# Patient Record
Sex: FEMALE | Race: WHITE | Employment: OTHER | URBAN - METROPOLITAN AREA
[De-identification: names, ages, dates, MRNs, and addresses within clinical notes are randomized per-mention and may not be internally consistent; named-entity substitution may affect disease eponyms.]

---

## 2018-01-01 ENCOUNTER — HOSPITAL ENCOUNTER (OUTPATIENT)
Dept: WOUND CARE | Age: 83
Discharge: HOME | End: 2018-05-10
Payer: MEDICARE

## 2018-01-01 ENCOUNTER — APPOINTMENT (OUTPATIENT)
Dept: GENERAL RADIOLOGY | Age: 83
DRG: 189 | End: 2018-01-01
Attending: PHYSICIAN ASSISTANT
Payer: MEDICARE

## 2018-01-01 ENCOUNTER — APPOINTMENT (OUTPATIENT)
Dept: GENERAL RADIOLOGY | Age: 83
DRG: 189 | End: 2018-01-01
Attending: NURSE PRACTITIONER
Payer: MEDICARE

## 2018-01-01 ENCOUNTER — APPOINTMENT (OUTPATIENT)
Dept: GENERAL RADIOLOGY | Age: 83
DRG: 189 | End: 2018-01-01
Attending: INTERNAL MEDICINE
Payer: MEDICARE

## 2018-01-01 ENCOUNTER — HOSPITAL ENCOUNTER (INPATIENT)
Age: 83
LOS: 3 days | DRG: 189 | End: 2018-06-08
Attending: EMERGENCY MEDICINE | Admitting: FAMILY MEDICINE
Payer: MEDICARE

## 2018-01-01 ENCOUNTER — HOSPITAL ENCOUNTER (OUTPATIENT)
Dept: GENERAL RADIOLOGY | Age: 83
Discharge: HOME | End: 2018-05-10
Attending: PAIN MEDICINE
Payer: MEDICARE

## 2018-01-01 ENCOUNTER — HOSPITAL ENCOUNTER (OUTPATIENT)
Dept: BONE DENSITY | Age: 83
Discharge: HOME | End: 2018-05-10
Attending: PAIN MEDICINE
Payer: MEDICARE

## 2018-01-01 ENCOUNTER — APPOINTMENT (OUTPATIENT)
Dept: GENERAL RADIOLOGY | Age: 83
DRG: 189 | End: 2018-01-01
Attending: EMERGENCY MEDICINE
Payer: MEDICARE

## 2018-01-01 VITALS
DIASTOLIC BLOOD PRESSURE: 74 MMHG | TEMPERATURE: 97.7 F | RESPIRATION RATE: 22 BRPM | SYSTOLIC BLOOD PRESSURE: 129 MMHG | HEART RATE: 96 BPM

## 2018-01-01 VITALS
HEART RATE: 101 BPM | OXYGEN SATURATION: 60 % | WEIGHT: 100.97 LBS | TEMPERATURE: 98 F | DIASTOLIC BLOOD PRESSURE: 58 MMHG | HEIGHT: 55 IN | BODY MASS INDEX: 23.37 KG/M2 | RESPIRATION RATE: 32 BRPM | SYSTOLIC BLOOD PRESSURE: 103 MMHG

## 2018-01-01 DIAGNOSIS — R06.02 SHORTNESS OF BREATH: ICD-10-CM

## 2018-01-01 DIAGNOSIS — R53.81 PHYSICAL DEBILITY: ICD-10-CM

## 2018-01-01 DIAGNOSIS — M81.0 SENILE OSTEOPOROSIS: ICD-10-CM

## 2018-01-01 DIAGNOSIS — R63.30 FEEDING DIFFICULTIES: ICD-10-CM

## 2018-01-01 DIAGNOSIS — A41.9 SEPSIS, DUE TO UNSPECIFIED ORGANISM: Primary | ICD-10-CM

## 2018-01-01 DIAGNOSIS — M19.90 OSTEOARTHRITIS: ICD-10-CM

## 2018-01-01 DIAGNOSIS — Z51.5 DYING CARE: ICD-10-CM

## 2018-01-01 LAB
ALBUMIN SERPL-MCNC: 3.7 G/DL (ref 3.5–5)
ALBUMIN/GLOB SERPL: 0.8 {RATIO} (ref 1.1–2.2)
ALP SERPL-CCNC: 87 U/L (ref 45–117)
ALT SERPL-CCNC: 24 U/L (ref 12–78)
ANION GAP SERPL CALC-SCNC: 6 MMOL/L (ref 5–15)
ANION GAP SERPL CALC-SCNC: 6 MMOL/L (ref 5–15)
ANION GAP SERPL CALC-SCNC: 8 MMOL/L (ref 5–15)
APPEARANCE UR: CLEAR
ARTERIAL PATENCY WRIST A: ABNORMAL
ARTERIAL PATENCY WRIST A: ABNORMAL
ARTERIAL PATENCY WRIST A: YES
ARTERIAL PATENCY WRIST A: YES
AST SERPL-CCNC: 21 U/L (ref 15–37)
ATRIAL RATE: 114 BPM
BACTERIA SPEC CULT: ABNORMAL
BACTERIA URNS QL MICRO: NEGATIVE /HPF
BASE EXCESS BLD CALC-SCNC: 17 MMOL/L
BASE EXCESS BLDV CALC-SCNC: 25 MMOL/L
BASOPHILS # BLD: 0 K/UL (ref 0–0.1)
BASOPHILS # BLD: 0 K/UL (ref 0–0.1)
BASOPHILS NFR BLD: 0 % (ref 0–1)
BASOPHILS NFR BLD: 0 % (ref 0–1)
BDY SITE: ABNORMAL
BILIRUB SERPL-MCNC: 0.5 MG/DL (ref 0.2–1)
BILIRUB UR QL CFM: NEGATIVE
BNP SERPL-MCNC: 5419 PG/ML (ref 0–450)
BUN SERPL-MCNC: 15 MG/DL (ref 6–20)
BUN SERPL-MCNC: 20 MG/DL (ref 6–20)
BUN SERPL-MCNC: 20 MG/DL (ref 6–20)
BUN/CREAT SERPL: 27 (ref 12–20)
BUN/CREAT SERPL: 28 (ref 12–20)
BUN/CREAT SERPL: 35 (ref 12–20)
CALCIUM SERPL-MCNC: 9 MG/DL (ref 8.5–10.1)
CALCIUM SERPL-MCNC: 9.1 MG/DL (ref 8.5–10.1)
CALCIUM SERPL-MCNC: 9.6 MG/DL (ref 8.5–10.1)
CALCULATED P AXIS, ECG09: 62 DEGREES
CALCULATED R AXIS, ECG10: 14 DEGREES
CALCULATED T AXIS, ECG11: 64 DEGREES
CHLORIDE SERPL-SCNC: 106 MMOL/L (ref 97–108)
CHLORIDE SERPL-SCNC: 92 MMOL/L (ref 97–108)
CHLORIDE SERPL-SCNC: 98 MMOL/L (ref 97–108)
CO2 SERPL-SCNC: 35 MMOL/L (ref 21–32)
CO2 SERPL-SCNC: 37 MMOL/L (ref 21–32)
CO2 SERPL-SCNC: 40 MMOL/L (ref 21–32)
COLOR UR: ABNORMAL
CREAT SERPL-MCNC: 0.54 MG/DL (ref 0.55–1.02)
CREAT SERPL-MCNC: 0.57 MG/DL (ref 0.55–1.02)
CREAT SERPL-MCNC: 0.75 MG/DL (ref 0.55–1.02)
DIAGNOSIS, 93000: NORMAL
DIFFERENTIAL METHOD BLD: ABNORMAL
DIFFERENTIAL METHOD BLD: ABNORMAL
EOSINOPHIL # BLD: 0 K/UL (ref 0–0.4)
EOSINOPHIL # BLD: 0 K/UL (ref 0–0.4)
EOSINOPHIL NFR BLD: 0 % (ref 0–7)
EOSINOPHIL NFR BLD: 0 % (ref 0–7)
EPITH CASTS URNS QL MICRO: ABNORMAL /LPF
ERYTHROCYTE [DISTWIDTH] IN BLOOD BY AUTOMATED COUNT: 12 % (ref 11.5–14.5)
ERYTHROCYTE [DISTWIDTH] IN BLOOD BY AUTOMATED COUNT: 12.1 % (ref 11.5–14.5)
GAS FLOW.O2 O2 DELIVERY SYS: ABNORMAL L/MIN
GAS FLOW.O2 SETTING OXYMISER: 15 L/M
GAS FLOW.O2 SETTING OXYMISER: 15 L/M
GLOBULIN SER CALC-MCNC: 4.4 G/DL (ref 2–4)
GLUCOSE BLD STRIP.AUTO-MCNC: 142 MG/DL (ref 65–100)
GLUCOSE BLD STRIP.AUTO-MCNC: 152 MG/DL (ref 65–100)
GLUCOSE BLD STRIP.AUTO-MCNC: 155 MG/DL (ref 65–100)
GLUCOSE SERPL-MCNC: 118 MG/DL (ref 65–100)
GLUCOSE SERPL-MCNC: 157 MG/DL (ref 65–100)
GLUCOSE SERPL-MCNC: 159 MG/DL (ref 65–100)
GLUCOSE UR STRIP.AUTO-MCNC: NEGATIVE MG/DL
GRAM STN SPEC: ABNORMAL
GRAM STN SPEC: ABNORMAL
HCO3 BLD-SCNC: 41.7 MMOL/L (ref 22–26)
HCO3 BLDV-SCNC: 50 MMOL/L (ref 23–28)
HCT VFR BLD AUTO: 34.2 % (ref 35–47)
HCT VFR BLD AUTO: 40.3 % (ref 35–47)
HGB BLD-MCNC: 10.1 G/DL (ref 11.5–16)
HGB BLD-MCNC: 11.8 G/DL (ref 11.5–16)
HGB UR QL STRIP: ABNORMAL
HYALINE CASTS URNS QL MICRO: ABNORMAL /LPF (ref 0–5)
IMM GRANULOCYTES # BLD: 0 K/UL
IMM GRANULOCYTES # BLD: 0 K/UL
IMM GRANULOCYTES NFR BLD AUTO: 0 %
IMM GRANULOCYTES NFR BLD AUTO: 0 %
KETONES UR QL STRIP.AUTO: NEGATIVE MG/DL
LACTATE SERPL-SCNC: 2 MMOL/L (ref 0.4–2)
LEUKOCYTE ESTERASE UR QL STRIP.AUTO: NEGATIVE
LYMPHOCYTES # BLD: 0.7 K/UL (ref 0.8–3.5)
LYMPHOCYTES # BLD: 0.8 K/UL (ref 0.8–3.5)
LYMPHOCYTES NFR BLD: 5 % (ref 12–49)
LYMPHOCYTES NFR BLD: 5 % (ref 12–49)
M PNEUMO IGG SER IA-ACNC: 903 U/ML (ref 0–99)
MCH RBC QN AUTO: 29.7 PG (ref 26–34)
MCH RBC QN AUTO: 29.9 PG (ref 26–34)
MCHC RBC AUTO-ENTMCNC: 29.3 G/DL (ref 30–36.5)
MCHC RBC AUTO-ENTMCNC: 29.5 G/DL (ref 30–36.5)
MCV RBC AUTO: 100.6 FL (ref 80–99)
MCV RBC AUTO: 102 FL (ref 80–99)
MONOCYTES # BLD: 0.3 K/UL (ref 0–1)
MONOCYTES # BLD: 1.6 K/UL (ref 0–1)
MONOCYTES NFR BLD: 10 % (ref 5–13)
MONOCYTES NFR BLD: 2 % (ref 5–13)
NEUTS BAND NFR BLD MANUAL: 11 % (ref 0–6)
NEUTS BAND NFR BLD MANUAL: 14 % (ref 0–6)
NEUTS SEG # BLD: 13.4 K/UL (ref 1.8–8)
NEUTS SEG # BLD: 13.8 K/UL (ref 1.8–8)
NEUTS SEG NFR BLD: 71 % (ref 32–75)
NEUTS SEG NFR BLD: 82 % (ref 32–75)
NITRITE UR QL STRIP.AUTO: NEGATIVE
NRBC # BLD: 0 K/UL (ref 0–0.01)
NRBC # BLD: 0 K/UL (ref 0–0.01)
NRBC BLD-RTO: 0 PER 100 WBC
NRBC BLD-RTO: 0 PER 100 WBC
O2/TOTAL GAS SETTING VFR VENT: 0.7 %
O2/TOTAL GAS SETTING VFR VENT: 70 %
P-R INTERVAL, ECG05: 112 MS
PCO2 BLD: 64.7 MMHG (ref 35–45)
PCO2 BLD: >90 MMHG (ref 35–45)
PCO2 BLD: >90 MMHG (ref 35–45)
PCO2 BLDV: 86 MMHG (ref 41–51)
PEEP RESPIRATORY: 6 CMH2O
PEEP RESPIRATORY: 7 CMH2O
PH BLD: 7.26 [PH] (ref 7.35–7.45)
PH BLD: 7.28 [PH] (ref 7.35–7.45)
PH BLD: 7.42 [PH] (ref 7.35–7.45)
PH BLDV: 7.37 [PH] (ref 7.32–7.42)
PH UR STRIP: 6 [PH] (ref 5–8)
PIP ISTAT,IPIP: 18
PIP ISTAT,IPIP: 18
PLATELET # BLD AUTO: 219 K/UL (ref 150–400)
PLATELET # BLD AUTO: 243 K/UL (ref 150–400)
PMV BLD AUTO: 9.1 FL (ref 8.9–12.9)
PMV BLD AUTO: 9.2 FL (ref 8.9–12.9)
PO2 BLD: 189 MMHG (ref 80–100)
PO2 BLD: 207 MMHG (ref 80–100)
PO2 BLD: 88 MMHG (ref 80–100)
PO2 BLDV: 27 MMHG (ref 25–40)
POTASSIUM SERPL-SCNC: 3.7 MMOL/L (ref 3.5–5.1)
POTASSIUM SERPL-SCNC: 4 MMOL/L (ref 3.5–5.1)
POTASSIUM SERPL-SCNC: 4 MMOL/L (ref 3.5–5.1)
PRESSURE SUPPORT SETTING VENT: 11 CMH2O
PRESSURE SUPPORT SETTING VENT: 12 CMH2O
PROT SERPL-MCNC: 8.1 G/DL (ref 6.4–8.2)
PROT UR STRIP-MCNC: 30 MG/DL
Q-T INTERVAL, ECG07: 320 MS
QRS DURATION, ECG06: 78 MS
QTC CALCULATION (BEZET), ECG08: 441 MS
RBC # BLD AUTO: 3.4 M/UL (ref 3.8–5.2)
RBC # BLD AUTO: 3.95 M/UL (ref 3.8–5.2)
RBC #/AREA URNS HPF: ABNORMAL /HPF (ref 0–5)
RBC MORPH BLD: ABNORMAL
SAO2 % BLD: 96 % (ref 92–97)
SAO2 % BLDV: 43 % (ref 65–88)
SERVICE CMNT-IMP: ABNORMAL
SODIUM SERPL-SCNC: 140 MMOL/L (ref 136–145)
SODIUM SERPL-SCNC: 141 MMOL/L (ref 136–145)
SODIUM SERPL-SCNC: 147 MMOL/L (ref 136–145)
SP GR UR REFRACTOMETRY: 1.02 (ref 1–1.03)
SPECIMEN TYPE: ABNORMAL
SPONTANEOUS TIMED, IST: YES
TOTAL RESP. RATE, ITRR: 20
UR CULT HOLD, URHOLD: NORMAL
UROBILINOGEN UR QL STRIP.AUTO: 0.2 EU/DL (ref 0.2–1)
VENTRICULAR RATE, ECG03: 114 BPM
WBC # BLD AUTO: 14.8 K/UL (ref 3.6–11)
WBC # BLD AUTO: 15.8 K/UL (ref 3.6–11)
WBC URNS QL MICRO: ABNORMAL /HPF (ref 0–4)

## 2018-01-01 PROCEDURE — 36415 COLL VENOUS BLD VENIPUNCTURE: CPT | Performed by: HOSPITALIST

## 2018-01-01 PROCEDURE — 73502 X-RAY EXAM HIP UNI 2-3 VIEWS: CPT

## 2018-01-01 PROCEDURE — 77030034696 HC CATH URETH FOL 2W BARD -A

## 2018-01-01 PROCEDURE — 5A09457 ASSISTANCE WITH RESPIRATORY VENTILATION, 24-96 CONSECUTIVE HOURS, CONTINUOUS POSITIVE AIRWAY PRESSURE: ICD-10-PCS | Performed by: FAMILY MEDICINE

## 2018-01-01 PROCEDURE — 74011000258 HC RX REV CODE- 258: Performed by: FAMILY MEDICINE

## 2018-01-01 PROCEDURE — 74011250636 HC RX REV CODE- 250/636: Performed by: NURSE PRACTITIONER

## 2018-01-01 PROCEDURE — 74011250636 HC RX REV CODE- 250/636: Performed by: INTERNAL MEDICINE

## 2018-01-01 PROCEDURE — 87899 AGENT NOS ASSAY W/OPTIC: CPT | Performed by: NURSE PRACTITIONER

## 2018-01-01 PROCEDURE — 99204 OFFICE O/P NEW MOD 45 MIN: CPT

## 2018-01-01 PROCEDURE — 87449 NOS EACH ORGANISM AG IA: CPT | Performed by: HOSPITALIST

## 2018-01-01 PROCEDURE — 77010033678 HC OXYGEN DAILY

## 2018-01-01 PROCEDURE — 72100 X-RAY EXAM L-S SPINE 2/3 VWS: CPT

## 2018-01-01 PROCEDURE — 94640 AIRWAY INHALATION TREATMENT: CPT

## 2018-01-01 PROCEDURE — 94664 DEMO&/EVAL PT USE INHALER: CPT

## 2018-01-01 PROCEDURE — 36600 WITHDRAWAL OF ARTERIAL BLOOD: CPT

## 2018-01-01 PROCEDURE — 87186 SC STD MICRODIL/AGAR DIL: CPT | Performed by: PHYSICIAN ASSISTANT

## 2018-01-01 PROCEDURE — 74011000250 HC RX REV CODE- 250: Performed by: PHYSICIAN ASSISTANT

## 2018-01-01 PROCEDURE — 74011250636 HC RX REV CODE- 250/636: Performed by: PHYSICIAN ASSISTANT

## 2018-01-01 PROCEDURE — 74011250636 HC RX REV CODE- 250/636

## 2018-01-01 PROCEDURE — 74011250636 HC RX REV CODE- 250/636: Performed by: FAMILY MEDICINE

## 2018-01-01 PROCEDURE — 74011000258 HC RX REV CODE- 258: Performed by: PHYSICIAN ASSISTANT

## 2018-01-01 PROCEDURE — 71045 X-RAY EXAM CHEST 1 VIEW: CPT

## 2018-01-01 PROCEDURE — 82803 BLOOD GASES ANY COMBINATION: CPT

## 2018-01-01 PROCEDURE — 80048 BASIC METABOLIC PNL TOTAL CA: CPT | Performed by: FAMILY MEDICINE

## 2018-01-01 PROCEDURE — 77030034848

## 2018-01-01 PROCEDURE — 86738 MYCOPLASMA ANTIBODY: CPT | Performed by: NURSE PRACTITIONER

## 2018-01-01 PROCEDURE — 94660 CPAP INITIATION&MGMT: CPT

## 2018-01-01 PROCEDURE — 65270000029 HC RM PRIVATE

## 2018-01-01 PROCEDURE — 77030013140 HC MSK NEB VYRM -A

## 2018-01-01 PROCEDURE — 74011000250 HC RX REV CODE- 250: Performed by: OTOLARYNGOLOGY

## 2018-01-01 PROCEDURE — 76450000000

## 2018-01-01 PROCEDURE — 77030011943

## 2018-01-01 PROCEDURE — 87077 CULTURE AEROBIC IDENTIFY: CPT | Performed by: PHYSICIAN ASSISTANT

## 2018-01-01 PROCEDURE — 96365 THER/PROPH/DIAG IV INF INIT: CPT

## 2018-01-01 PROCEDURE — 99284 EMERGENCY DEPT VISIT MOD MDM: CPT

## 2018-01-01 PROCEDURE — 80048 BASIC METABOLIC PNL TOTAL CA: CPT | Performed by: HOSPITALIST

## 2018-01-01 PROCEDURE — 93005 ELECTROCARDIOGRAM TRACING: CPT

## 2018-01-01 PROCEDURE — 77080 DXA BONE DENSITY AXIAL: CPT

## 2018-01-01 PROCEDURE — 71101 X-RAY EXAM UNILAT RIBS/CHEST: CPT

## 2018-01-01 PROCEDURE — 93306 TTE W/DOPPLER COMPLETE: CPT

## 2018-01-01 PROCEDURE — 83605 ASSAY OF LACTIC ACID: CPT | Performed by: EMERGENCY MEDICINE

## 2018-01-01 PROCEDURE — 87205 SMEAR GRAM STAIN: CPT | Performed by: PHYSICIAN ASSISTANT

## 2018-01-01 PROCEDURE — 36415 COLL VENOUS BLD VENIPUNCTURE: CPT | Performed by: FAMILY MEDICINE

## 2018-01-01 PROCEDURE — 96367 TX/PROPH/DG ADDL SEQ IV INF: CPT

## 2018-01-01 PROCEDURE — 81001 URINALYSIS AUTO W/SCOPE: CPT | Performed by: EMERGENCY MEDICINE

## 2018-01-01 PROCEDURE — 82962 GLUCOSE BLOOD TEST: CPT

## 2018-01-01 PROCEDURE — 71100 X-RAY EXAM RIBS UNI 2 VIEWS: CPT

## 2018-01-01 PROCEDURE — 83880 ASSAY OF NATRIURETIC PEPTIDE: CPT | Performed by: NURSE PRACTITIONER

## 2018-01-01 PROCEDURE — 36415 COLL VENOUS BLD VENIPUNCTURE: CPT | Performed by: NURSE PRACTITIONER

## 2018-01-01 PROCEDURE — 51702 INSERT TEMP BLADDER CATH: CPT

## 2018-01-01 PROCEDURE — 65610000006 HC RM INTENSIVE CARE

## 2018-01-01 PROCEDURE — 72070 X-RAY EXAM THORAC SPINE 2VWS: CPT

## 2018-01-01 PROCEDURE — 87040 BLOOD CULTURE FOR BACTERIA: CPT | Performed by: EMERGENCY MEDICINE

## 2018-01-01 PROCEDURE — 85025 COMPLETE CBC W/AUTO DIFF WBC: CPT | Performed by: FAMILY MEDICINE

## 2018-01-01 PROCEDURE — 77030012940 HC DRSG HYDRCOIL BMS -B

## 2018-01-01 PROCEDURE — 85025 COMPLETE CBC W/AUTO DIFF WBC: CPT | Performed by: EMERGENCY MEDICINE

## 2018-01-01 PROCEDURE — 80053 COMPREHEN METABOLIC PANEL: CPT | Performed by: EMERGENCY MEDICINE

## 2018-01-01 PROCEDURE — 74011250637 HC RX REV CODE- 250/637: Performed by: NURSE PRACTITIONER

## 2018-01-01 PROCEDURE — 65660000001 HC RM ICU INTERMED STEPDOWN

## 2018-01-01 PROCEDURE — 74011250636 HC RX REV CODE- 250/636: Performed by: EMERGENCY MEDICINE

## 2018-01-01 PROCEDURE — 36415 COLL VENOUS BLD VENIPUNCTURE: CPT | Performed by: EMERGENCY MEDICINE

## 2018-01-01 PROCEDURE — 74011000258 HC RX REV CODE- 258: Performed by: EMERGENCY MEDICINE

## 2018-01-01 RX ORDER — LORAZEPAM 2 MG/ML
1 CONCENTRATE ORAL
Status: DISCONTINUED | OUTPATIENT
Start: 2018-01-01 | End: 2018-06-09 | Stop reason: HOSPADM

## 2018-01-01 RX ORDER — FUROSEMIDE 10 MG/ML
20 INJECTION INTRAMUSCULAR; INTRAVENOUS ONCE
Status: COMPLETED | OUTPATIENT
Start: 2018-01-01 | End: 2018-01-01

## 2018-01-01 RX ORDER — BUDESONIDE 0.5 MG/2ML
500 INHALANT ORAL
Status: DISCONTINUED | OUTPATIENT
Start: 2018-01-01 | End: 2018-01-01

## 2018-01-01 RX ORDER — SODIUM CHLORIDE 0.9 % (FLUSH) 0.9 %
5-10 SYRINGE (ML) INJECTION AS NEEDED
Status: DISCONTINUED | OUTPATIENT
Start: 2018-01-01 | End: 2018-01-01

## 2018-01-01 RX ORDER — SODIUM CHLORIDE 0.9 % (FLUSH) 0.9 %
5-10 SYRINGE (ML) INJECTION EVERY 8 HOURS
Status: DISCONTINUED | OUTPATIENT
Start: 2018-01-01 | End: 2018-01-01

## 2018-01-01 RX ORDER — FUROSEMIDE 40 MG/1
40 TABLET ORAL DAILY
COMMUNITY

## 2018-01-01 RX ORDER — SODIUM CHLORIDE 0.9 % (FLUSH) 0.9 %
SYRINGE (ML) INJECTION
Status: DISPENSED
Start: 2018-01-01 | End: 2018-01-01

## 2018-01-01 RX ORDER — MORPHINE SULFATE 20 MG/ML
10 SOLUTION ORAL
Status: DISCONTINUED | OUTPATIENT
Start: 2018-01-01 | End: 2018-06-09 | Stop reason: HOSPADM

## 2018-01-01 RX ORDER — FUROSEMIDE 10 MG/ML
INJECTION INTRAMUSCULAR; INTRAVENOUS
Status: DISCONTINUED
Start: 2018-01-01 | End: 2018-01-01

## 2018-01-01 RX ORDER — SODIUM CHLORIDE 0.9 % (FLUSH) 0.9 %
5-10 SYRINGE (ML) INJECTION AS NEEDED
Status: CANCELLED | OUTPATIENT
Start: 2018-01-01

## 2018-01-01 RX ORDER — LEVOFLOXACIN 5 MG/ML
750 INJECTION, SOLUTION INTRAVENOUS ONCE
Status: DISCONTINUED | OUTPATIENT
Start: 2018-01-01 | End: 2018-01-01 | Stop reason: CLARIF

## 2018-01-01 RX ORDER — GLUCOSAMINE SULFATE 1500 MG
1000 POWDER IN PACKET (EA) ORAL DAILY
COMMUNITY

## 2018-01-01 RX ORDER — ARFORMOTEROL TARTRATE 15 UG/2ML
15 SOLUTION RESPIRATORY (INHALATION)
Status: DISCONTINUED | OUTPATIENT
Start: 2018-01-01 | End: 2018-01-01

## 2018-01-01 RX ORDER — DEXTROSE MONOHYDRATE AND SODIUM CHLORIDE 5; .45 G/100ML; G/100ML
50 INJECTION, SOLUTION INTRAVENOUS CONTINUOUS
Status: DISCONTINUED | OUTPATIENT
Start: 2018-01-01 | End: 2018-01-01

## 2018-01-01 RX ORDER — LOSARTAN POTASSIUM 100 MG/1
100 TABLET ORAL DAILY
COMMUNITY

## 2018-01-01 RX ORDER — VANCOMYCIN HYDROCHLORIDE
1250 ONCE
Status: COMPLETED | OUTPATIENT
Start: 2018-01-01 | End: 2018-01-01

## 2018-01-01 RX ORDER — SODIUM CHLORIDE 9 MG/ML
75 INJECTION, SOLUTION INTRAVENOUS CONTINUOUS
Status: DISCONTINUED | OUTPATIENT
Start: 2018-01-01 | End: 2018-01-01

## 2018-01-01 RX ORDER — NALOXONE HYDROCHLORIDE 0.4 MG/ML
INJECTION, SOLUTION INTRAMUSCULAR; INTRAVENOUS; SUBCUTANEOUS
Status: COMPLETED
Start: 2018-01-01 | End: 2018-01-01

## 2018-01-01 RX ORDER — IPRATROPIUM BROMIDE AND ALBUTEROL SULFATE 2.5; .5 MG/3ML; MG/3ML
3 SOLUTION RESPIRATORY (INHALATION)
Status: DISCONTINUED | OUTPATIENT
Start: 2018-01-01 | End: 2018-01-01

## 2018-01-01 RX ORDER — NALOXONE HYDROCHLORIDE 1 MG/ML
0.4 INJECTION INTRAMUSCULAR; INTRAVENOUS; SUBCUTANEOUS
Status: DISPENSED | OUTPATIENT
Start: 2018-01-01 | End: 2018-01-01

## 2018-01-01 RX ORDER — VANCOMYCIN HYDROCHLORIDE 1 G/20ML
1 INJECTION, POWDER, LYOPHILIZED, FOR SOLUTION INTRAVENOUS EVERY 12 HOURS
Status: DISCONTINUED | OUTPATIENT
Start: 2018-01-01 | End: 2018-01-01 | Stop reason: DRUGHIGH

## 2018-01-01 RX ORDER — LIDOCAINE HYDROCHLORIDE 20 MG/ML
JELLY TOPICAL
Status: COMPLETED | OUTPATIENT
Start: 2018-01-01 | End: 2018-01-01

## 2018-01-01 RX ORDER — HYOSCYAMINE SULFATE 0.12 MG/1
0.12 TABLET SUBLINGUAL
Status: DISCONTINUED | OUTPATIENT
Start: 2018-01-01 | End: 2018-06-09 | Stop reason: HOSPADM

## 2018-01-01 RX ORDER — AMLODIPINE BESYLATE 5 MG/1
5 TABLET ORAL DAILY
COMMUNITY

## 2018-01-01 RX ORDER — ROSUVASTATIN CALCIUM 5 MG/1
5 TABLET, COATED ORAL
COMMUNITY

## 2018-01-01 RX ORDER — LEVOFLOXACIN 5 MG/ML
750 INJECTION, SOLUTION INTRAVENOUS
Status: DISCONTINUED | OUTPATIENT
Start: 2018-01-01 | End: 2018-01-01

## 2018-01-01 RX ORDER — HYDROCODONE BITARTRATE AND ACETAMINOPHEN 10; 325 MG/1; MG/1
1 TABLET ORAL
COMMUNITY

## 2018-01-01 RX ADMIN — CEFEPIME HYDROCHLORIDE 2 G: 2 INJECTION, POWDER, FOR SOLUTION INTRAVENOUS at 22:00

## 2018-01-01 RX ADMIN — SODIUM CHLORIDE 1000 ML: 900 INJECTION, SOLUTION INTRAVENOUS at 21:16

## 2018-01-01 RX ADMIN — METHYLPREDNISOLONE SODIUM SUCCINATE 40 MG: 40 INJECTION, POWDER, FOR SOLUTION INTRAMUSCULAR; INTRAVENOUS at 06:00

## 2018-01-01 RX ADMIN — SODIUM CHLORIDE 75 ML/HR: 900 INJECTION, SOLUTION INTRAVENOUS at 00:43

## 2018-01-01 RX ADMIN — LORAZEPAM 1 MG: 2 SOLUTION, CONCENTRATE ORAL at 13:04

## 2018-01-01 RX ADMIN — LEVOFLOXACIN 750 MG: 5 INJECTION, SOLUTION INTRAVENOUS at 22:13

## 2018-01-01 RX ADMIN — MORPHINE SULFATE 10 MG: 20 SOLUTION ORAL at 21:04

## 2018-01-01 RX ADMIN — ARFORMOTEROL TARTRATE 15 MCG: 15 SOLUTION RESPIRATORY (INHALATION) at 09:51

## 2018-01-01 RX ADMIN — Medication 10 ML: at 07:00

## 2018-01-01 RX ADMIN — BUDESONIDE 500 MCG: 0.5 INHALANT RESPIRATORY (INHALATION) at 20:25

## 2018-01-01 RX ADMIN — BUDESONIDE 500 MCG: 0.5 INHALANT RESPIRATORY (INHALATION) at 07:28

## 2018-01-01 RX ADMIN — NALOXONE HYDROCHLORIDE: 0.4 INJECTION, SOLUTION INTRAMUSCULAR; INTRAVENOUS; SUBCUTANEOUS at 23:15

## 2018-01-01 RX ADMIN — METHYLPREDNISOLONE SODIUM SUCCINATE 40 MG: 40 INJECTION, POWDER, FOR SOLUTION INTRAMUSCULAR; INTRAVENOUS at 06:17

## 2018-01-01 RX ADMIN — METHYLPREDNISOLONE SODIUM SUCCINATE 40 MG: 40 INJECTION, POWDER, FOR SOLUTION INTRAMUSCULAR; INTRAVENOUS at 22:13

## 2018-01-01 RX ADMIN — MORPHINE SULFATE 10 MG: 20 SOLUTION ORAL at 11:47

## 2018-01-01 RX ADMIN — LORAZEPAM 1 MG: 2 SOLUTION, CONCENTRATE ORAL at 18:28

## 2018-01-01 RX ADMIN — Medication 10 ML: at 22:03

## 2018-01-01 RX ADMIN — FUROSEMIDE 20 MG: 10 INJECTION, SOLUTION INTRAMUSCULAR; INTRAVENOUS at 08:17

## 2018-01-01 RX ADMIN — DEXTROSE MONOHYDRATE AND SODIUM CHLORIDE 50 ML/HR: 5; .45 INJECTION, SOLUTION INTRAVENOUS at 07:01

## 2018-01-01 RX ADMIN — METHYLPREDNISOLONE SODIUM SUCCINATE 40 MG: 40 INJECTION, POWDER, FOR SOLUTION INTRAMUSCULAR; INTRAVENOUS at 14:04

## 2018-01-01 RX ADMIN — Medication 10 ML: at 09:05

## 2018-01-01 RX ADMIN — MORPHINE SULFATE 10 MG: 20 SOLUTION ORAL at 12:42

## 2018-01-01 RX ADMIN — LIDOCAINE HYDROCHLORIDE 5 ML: 20 JELLY TOPICAL at 08:45

## 2018-01-01 RX ADMIN — MORPHINE SULFATE 10 MG: 20 SOLUTION ORAL at 19:52

## 2018-01-01 RX ADMIN — Medication 10 ML: at 14:00

## 2018-01-01 RX ADMIN — METHYLPREDNISOLONE SODIUM SUCCINATE 40 MG: 40 INJECTION, POWDER, FOR SOLUTION INTRAMUSCULAR; INTRAVENOUS at 10:08

## 2018-01-01 RX ADMIN — DEXTROSE MONOHYDRATE AND SODIUM CHLORIDE 50 ML/HR: 5; .45 INJECTION, SOLUTION INTRAVENOUS at 10:08

## 2018-01-01 RX ADMIN — BUDESONIDE 500 MCG: 0.5 INHALANT RESPIRATORY (INHALATION) at 21:36

## 2018-01-01 RX ADMIN — CEFEPIME HYDROCHLORIDE 2 G: 2 INJECTION, POWDER, FOR SOLUTION INTRAVENOUS at 21:42

## 2018-01-01 RX ADMIN — CEFEPIME HYDROCHLORIDE 2 G: 2 INJECTION, POWDER, FOR SOLUTION INTRAVENOUS at 20:36

## 2018-01-01 RX ADMIN — CEFEPIME HYDROCHLORIDE 2 G: 2 INJECTION, POWDER, FOR SOLUTION INTRAVENOUS at 08:13

## 2018-01-01 RX ADMIN — IPRATROPIUM BROMIDE AND ALBUTEROL SULFATE 3 ML: .5; 3 SOLUTION RESPIRATORY (INHALATION) at 20:25

## 2018-01-01 RX ADMIN — Medication 10 ML: at 22:13

## 2018-01-01 RX ADMIN — IPRATROPIUM BROMIDE AND ALBUTEROL SULFATE 3 ML: .5; 3 SOLUTION RESPIRATORY (INHALATION) at 21:35

## 2018-01-01 RX ADMIN — IPRATROPIUM BROMIDE AND ALBUTEROL SULFATE 3 ML: .5; 3 SOLUTION RESPIRATORY (INHALATION) at 07:28

## 2018-01-01 RX ADMIN — MORPHINE SULFATE 10 MG: 20 SOLUTION ORAL at 10:54

## 2018-01-01 RX ADMIN — BUDESONIDE 500 MCG: 0.5 INHALANT RESPIRATORY (INHALATION) at 08:06

## 2018-01-01 RX ADMIN — CEFEPIME HYDROCHLORIDE 2 G: 2 INJECTION, POWDER, FOR SOLUTION INTRAVENOUS at 09:38

## 2018-01-01 RX ADMIN — METHYLPREDNISOLONE SODIUM SUCCINATE 40 MG: 40 INJECTION, POWDER, FOR SOLUTION INTRAMUSCULAR; INTRAVENOUS at 14:48

## 2018-01-01 RX ADMIN — Medication 10 ML: at 06:18

## 2018-01-01 RX ADMIN — CEFEPIME HYDROCHLORIDE 2 G: 2 INJECTION, POWDER, FOR SOLUTION INTRAVENOUS at 09:05

## 2018-01-01 RX ADMIN — MORPHINE SULFATE 10 MG: 20 SOLUTION ORAL at 18:28

## 2018-01-01 RX ADMIN — LEVOFLOXACIN 750 MG: 5 INJECTION, SOLUTION INTRAVENOUS at 23:11

## 2018-01-01 RX ADMIN — VANCOMYCIN HYDROCHLORIDE 1250 MG: 10 INJECTION, POWDER, LYOPHILIZED, FOR SOLUTION INTRAVENOUS at 11:57

## 2018-01-01 RX ADMIN — LORAZEPAM 1 MG: 2 SOLUTION, CONCENTRATE ORAL at 20:34

## 2018-01-01 RX ADMIN — Medication 10 ML: at 14:04

## 2018-01-01 RX ADMIN — IPRATROPIUM BROMIDE AND ALBUTEROL SULFATE 3 ML: .5; 3 SOLUTION RESPIRATORY (INHALATION) at 17:43

## 2018-01-01 RX ADMIN — IPRATROPIUM BROMIDE AND ALBUTEROL SULFATE 3 ML: .5; 3 SOLUTION RESPIRATORY (INHALATION) at 13:13

## 2018-01-01 RX ADMIN — LORAZEPAM 1 MG: 2 SOLUTION, CONCENTRATE ORAL at 11:57

## 2018-01-01 RX ADMIN — ARFORMOTEROL TARTRATE 15 MCG: 15 SOLUTION RESPIRATORY (INHALATION) at 07:27

## 2018-01-01 RX ADMIN — BUDESONIDE 500 MCG: 0.5 INHALANT RESPIRATORY (INHALATION) at 09:51

## 2018-01-01 RX ADMIN — IPRATROPIUM BROMIDE AND ALBUTEROL SULFATE 3 ML: .5; 3 SOLUTION RESPIRATORY (INHALATION) at 13:10

## 2018-01-01 RX ADMIN — IPRATROPIUM BROMIDE AND ALBUTEROL SULFATE 3 ML: .5; 3 SOLUTION RESPIRATORY (INHALATION) at 08:06

## 2018-01-01 RX ADMIN — METHYLPREDNISOLONE SODIUM SUCCINATE 40 MG: 40 INJECTION, POWDER, FOR SOLUTION INTRAMUSCULAR; INTRAVENOUS at 22:01

## 2018-05-10 PROBLEM — L89.130: Status: ACTIVE | Noted: 2018-01-01

## 2018-05-10 NOTE — PROGRESS NOTES
Discharge Condition:Stable  Ambulatory Status:Wheelchair  Discharge Destination:Home  Transportation: Private auto  Accompanied by: Daughter

## 2018-05-10 NOTE — H&P
[address] [place] [address] HISTORY AND PHYSICAL      Kelli Lower  MR#: [id number]  : 1935  ACCOUNT #: [de-identified]   ADMIT DATE: 05/10/2018    HISTORY OF PRESENT ILLNESS:  The patient is an 25-year-old female who presents with her daughter for a pressure wound of her right lower back. This has been present for about 3 months. Initial treatment was Neosporin and now, home health nurses are applying a foam bandage. ALLERGIES:   NKDA    MEDICATIONS:Furosemide, amlodipine,Vitamin D, Statin, No meds for RA    SOCIAL HISTORY:  Cigarettes were discontinued in the . She denies alcohol. She denies drug use. REVIEW OF SYSTEMS:  Rheumatoid arthritis, which requires no treatment. She has COPD and is on nasal oxygen. She has hypertension, elevated cholesterol and severe scoliosis. Upon questioning, she has had a recent 7 pound weight loss of no known cause. 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 FAMILY HISTORY:  Hypertension in her parents. SOCIAL HISTORY:  The patient presents in a wheelchair with nasal oxygen. PHYSICAL EXAMINATION:  VITAL SIGNS:  Her temperature is 97.7, pulse 96, respirations 22, blood pressure 129/74. HEENT:  Examination shows tympanic membranes and middle ears to be clear. Oral cavity, oropharynx and palpation of neck normal. She is severely hard of hearing. NECK:  Palpation of the neck reveals no adenopathy. LUNGS:  Clear to auscultation and percussion. HEART:  Regular rate and rhythm without murmur. ABDOMEN:  Soft, nontender, no organomegaly. EXTREMITIES  Equal tone and strength bilaterally. Lower extremities, equal tone and strength bilaterally. No lesions noted. BACK:  Examination of the back reveals severe scoliosis. She has a 6.5 x 4.7 cm unstageable pressure ulcer of the right lower back over the area of scoliosis. There is an eschar in place.     PROCEDURE:  I have recommended that the eschar be removed to try to get back down to underlying healthy bleeding tissue. I explained if this is unsuccessful, then I will crosshatch it to try to allow medications to get into the area. She understands the plan. She understands the risks and benefits and wished to proceed. Therefore, under topical Xylocaine was applied for 10 minutes with use of forceps and a 15 blade. I tried to elevate one edge of the eschar and excised it and it was too uncomfortable and therefore, the entire wound was crosshatched repeatedly as deeply as possible to get through the eschar and to get down to underlying healthy viable tissue. ASSESSMENT AND PLAN:  For this unstageable pressure ulcer of the right lower back, L89.130, we are going to have the patient keep all pressure off this area. In addition, for the weight loss, she is going to increase her protein consumption and consumes 2 cans of Ensure per day. For a dressing, we are going to start with Santyl to be applied the thickness of a nickel coin, followed by a moist 4 x 4, followed by a dry 4 x 4, followed by tape. I am going to give this 2 weeks to see if this will soften and help remove the eschar. I will see her in followup in 2 weeks. If the eschar remains at that point, I will probably inject the entire large wound and attempt to excise this in the office. The patient and her daughter understand the plan. All questions were answered. CONDITION ON DISCHARGE:  Stable.       MD DA Reyonso/MILADIS  D: 05/10/2018 09:18     T: 05/10/2018 10:21  JOB #: [id number]  CC: Edouard Saleh MD

## 2018-05-10 NOTE — IP AVS SNAPSHOT
[address] [address] 
[phone] Patient: Anh Jimenez MRN: [id number] :[id number] About your hospitalization You were admitted on:  May 10, 2018 You last received care in the:  [facility] You were discharged on:  May 10, 2018 Why you were hospitalized Your primary diagnosis was:  Not on File Follow-up Information None Your Scheduled Appointments Thursday May 10, 2018 10:30 AM EDT  
(Arrive by 10:15 AM) DEXA BNE DNSTY STDY AXIAL with CLARA DEXA 1 [facility]) [address] [employer] 7 [id number] [phone] Please, no calcium supplements or antacids that coat the stomach (ex: Tums, Mylanta) 24 hours prior to procedure. Maintain normal diet and medications. Dairy products are allowed. Wear an outfit with an elastic waistband (no zipper or metal snaps). Check in at registration 15min before your appointment time unless you were instructed to do otherwise. Please arrive 15 minutes prior to appointment to register. Discharge Orders None A check malorie indicates which time of day the medication should be taken. My Medications ASK your doctor about these medications Instructions Each Dose to Equal  
 Morning Noon Evening Bedtime  
 amLODIPine 5 mg tablet Commonly known as:  Haiholly Clarkridge Your last dose was: Your next dose is: Take 5 mg by mouth daily. 5 mg  
    
   
   
   
  
 furosemide 40 mg tablet Commonly known as:  LASIX Your last dose was: Your next dose is: Take 40 mg by mouth daily. 40 mg HYDROcodone-acetaminophen  mg tablet Commonly known as:  Damaso Danny Your last dose was: Your next dose is: Take 1 Tab by mouth every four (4) hours as needed for Pain. 1 Tab  
    
   
   
   
  
 losartan 100 mg tablet Commonly known as:  COZAAR Your last dose was: Your next dose is: Take 100 mg by mouth daily. 100 mg  
    
   
   
   
  
 rosuvastatin 5 mg tablet Commonly known as:  CRESTOR Your last dose was: Your next dose is: Take 5 mg by mouth nightly. 5 mg VITAMIN D3 1,000 unit Cap Generic drug:  cholecalciferol Your last dose was: Your next dose is: Take 1,000 Units by mouth daily. 1000 Units Opioid Education Prescription Opioids: What You Need to Know: 
 
Prescription opioids can be used to help relieve moderate-to-severe pain and are often prescribed following a surgery or injury, or for certain health conditions. These medications can be an important part of treatment but also come with serious risks. Opioids are strong pain medicines. Examples include hydrocodone, oxycodone, fentanyl, and morphine. Heroin is an example of an illegal opioid. It is important to work with your health care provider to make sure you are getting the safest, most effective care. WHAT ARE THE RISKS AND SIDE EFFECTS OF OPIOID USE? Prescription opioids carry serious risks of addiction and overdose, especially with prolonged use. An opioid overdose, often marked by slow breathing, can cause sudden death. The use of prescription opioids can have a number of side effects as well, even when taken as directed. · Tolerance-meaning you might need to take more of a medication for the same pain relief · Physical dependence-meaning you have symptoms of withdrawal when the medication is stopped. Withdrawal symptoms can include nausea, sweating, chills, diarrhea, stomach cramps, and muscle aches.   Withdrawal can last up to several weeks, depending on which drug you took and how long you took it. · Increased sensitivity to pain · Constipation · Nausea, vomiting, and dry mouth · Sleepiness and dizziness · Confusion · Depression · Low levels of testosterone that can result in lower sex drive, energy, and strength · Itching and sweating RISKS ARE GREATER WITH:      
· History of drug misuse, substance use disorder, or overdose · Mental health conditions (such as depression or anxiety) · Sleep apnea · Older age (72 years or older) · Pregnancy Avoid alcohol while taking prescription opioids. Also, unless specifically advised by your health care provider, medications to avoid include: · Benzodiazepines (such as Xanax or Valium) · Muscle relaxants (such as Soma or Flexeril) · Hypnotics (such as Ambien or Lunesta) · Other prescription opioids KNOW YOUR OPTIONS Talk to your health care provider about ways to manage your pain that don't involve prescription opioids. Some of these options may actually work better and have fewer risks and side effects. Options may include: 
· Pain relievers such as acetaminophen, ibuprofen, and naproxen · Some medications that are also used for depression or seizures · Physical therapy and exercise · Counseling to help patients learn how to cope better with triggers of pain and stress. · Application of heat or cold compress · Massage therapy · Relaxation techniques Be Informed Make sure you know the name of your medication, how much and how often to take it, and its potential risks & side effects. IF YOU ARE PRESCRIBED OPIOIDS FOR PAIN: 
· Never take opioids in greater amounts or more often than prescribed. Remember the goal is not to be pain-free but to manage your pain at a tolerable level. · Follow up with your primary care provider to: · Work together to create a plan on how to manage your pain. · Talk about ways to help manage your pain that don't involve prescription opioids. · Talk about any and all concerns and side effects. · Help prevent misuse and abuse. · Never sell or share prescription opioids · Help prevent misuse and abuse. · Store prescription opioids in a secure place and out of reach of others (this may include visitors, children, friends, and family). · Safely dispose of unused/unwanted prescription opioids: Find your community drug take-back program or your pharmacy mail-back program, or flush them down the toilet, following guidance from the Food and Drug Administration (www.fda.gov/Drugs/ResourcesForYou). · Visit www.cdc.gov/drugoverdose to learn about the risks of opioid abuse and overdose. · If you believe you may be struggling with addiction, tell your health care provider and ask for guidance or call [employer] at 5-208-319-EEYK. Discharge Instructions None Introducing \[facility]\"" & HEALTH SERVICES! Binu Uriarte introduces Regalister patient portal. Now you can access parts of your medical record, email your doctor's office, and request medication refills online. 1. In your internet browser, go to [email or web address] 2. Click on the First Time User? Click Here link in the Sign In box. You will see the New Member Sign Up page. 3. Enter your Regalister Access Code exactly as it appears below. You will not need to use this code after youve completed the sign-up process. If you do not sign up before the expiration date, you must request a new code. · Regalister Access Code: [email or web address] Expires: 7/31/2018  1:19 PM 
 
4. Enter the last four digits of your Social Security Number (xxxx) and Date of Birth (mm/dd/yyyy) as indicated and click Submit. You will be taken to the next sign-up page. 5. Create a Regalister ID.  This will be your Regalister login ID and cannot be changed, so think of one that is secure and easy to remember. 6. Create a [employer] password. You can change your password at any time. 7. Enter your Password Reset Question and Answer. This can be used at a later time if you forget your password. 8. Enter your e-mail address. You will receive e-mail notification when new information is available in [address] 9. Click Sign Up. You can now view and download portions of your medical record. 10. Click the Download Summary menu link to download a portable copy of your medical information. If you have questions, please visit the Frequently Asked Questions section of the [employer] website. Remember, [employer] is NOT to be used for urgent needs. For medical emergencies, dial 911. Now available from your iPhone and Android! Introducing Jhonathan Cisneros As a [facility] patient, I wanted to make you aware of our electronic visit tool called Jhonathan Cisneros. [facility] allows you to connect within minutes with a medical provider 24 hours a day, seven days a week via a mobile device or tablet or logging into a secure website from your computer. You can access Jhonathan Cisneros from anywhere in the [place]. A virtual visit might be right for you when you have a simple condition and feel like you just dont want to get out of bed, or cant get away from work for an appointment, when your regular [facility] provider is not available (evenings, weekends or holidays), or when youre out of town and need minor care. Electronic visits cost only $49 and if the [employer] provider determines a prescription is needed to treat your condition, one can be electronically transmitted to a nearby pharmacy*. Please take a moment to enroll today if you have not already done so. The enrollment process is free and takes just a few minutes.   To enroll, please download the [employer] aaron to your tablet or phone, or visit [email or web address] to enroll on your computer. And, as an [address] [place] [address] patient with a [employer] account, the results of your visits will be scanned into your electronic medical record and your primary care provider will be able to view the scanned results. We urge you to continue to see your regular [facility] provider for your ongoing medical care. And while your primary care provider may not be the one available when you seek a [facility] virtual visit, the peace of mind you get from getting a real diagnosis real time can be priceless. For more information on 1[employer], view our Frequently Asked Questions (FAQs) at [email or web address]. Sincerely, 
 
Shirley Gómez MD 
Chief Medical Officer [address] *:  certain medications cannot be prescribed via [employer] Your Primary Care Physician (PCP) Primary Care Physician Office Phone Office Fax Katrin Mosley [phone] You are allergic to the following Not on File Recent Documentation Breastfeeding? Smoking Status No Former Smoker Emergency Contacts Name Discharge Info Relation Home Work Mobile ChristopherEvan alonzoon N/A  AT THIS TIME [6] Child [2] [phone] Patient Belongings The following personal items are in your possession at time of discharge: 
     Visual Aid: Glasses Discharge Instructions Attachments/References PRESSURE INJURIES (ENGLISH) PREVENTING PRESSURE SORES: GENERAL INFO (ENGLISH) TURNING A PATIENT: GENERAL INFO (ENGLISH) Patient Handouts Pressure Injuries: Care Instructions Your Care Instructions A pressure injury on the skin is caused by constant pressure to that area. These injuries-also called decubitus ulcers or bedsores-may happen when you lie in bed or sit in a wheelchair for a long time.  The constant pressure blocks the blood supply to the skin. This causes skin cells to die and creates a sore. Pressure injuries usually occur over bony areas, such as the hips, lower back, elbows, heels, and shoulders. They also can occur in places where the skin folds over on itself. You may have mild redness or open sores that are harder to heal. 
Good care at home can help heal pressure injuries. You or your caregiver needs to check your skin every day for sores. You need good nutrition and plenty of fluids to keep your skin healthy and prevent new pressure injuries. Follow-up care is a key part of your treatment and safety. Be sure to make and go to all appointments, and call your doctor if you are having problems. It's also a good idea to know your test results and keep a list of the medicines you take. How can you care for yourself at home? · If your doctor prescribed a medicated ointment or cream, use it exactly as prescribed. Call your doctor if you think you are having a problem with your medicine. · Wash pressure injuries every day, or as often as your doctor recommends. Most tap water is safe, but follow the advice of your doctor or nurse. He or she may recommend that you use a saline solution. This is a salt and water solution that you can buy over the counter. · Put on bandages as your doctor or wound care specialist says. · Keep healthy tissue around the sore clean and dry. · Check your skin every day for sores (or have a caregiver do it). · If you know what is causing the pressure that caused the sore, find a way to remove that pressure. To prevent pressure injuries · Change your position or have your caregiver help you change your position often. You may need to do this every 2 hours if you are in bed or every 15 minutes if you are in a wheelchair. This lowers the chance of making sores worse and getting new sores. · Use special mattresses or other support.  These may include low-pressure mattresses or cushions made of foam that can be filled with air, water, beads, or fiber. · Eat healthy foods with plenty of protein to help heal damaged skin and to help new skin grow. · Try to stay at a healthy weight. Being overweight can lead to more pressure on your skin. · Do not slide across sheets or slump in a chair or bed. · Do not smoke. Smoking dries the skin and reduces its blood supply. If you need help quitting, talk to your doctor about stop-smoking programs and medicines. These can increase your chances of quitting for good. When should you call for help? Call your doctor now or seek immediate medical care if: 
? · You have signs of infection, such as: 
¨ Increased pain, swelling, warmth, or redness. ¨ Red streaks leading from the sore. ¨ Pus draining from the sore. ¨ A fever. ? Watch closely for changes in your health, and be sure to contact your doctor if: 
? · Your pressure injuries are not healing. ? · You have new pressure injuries. ? · You need help changing positions in bed or in a chair. ? · Your caregiver needs help to move you. Where can you learn more? Go to http://tinaNinitedeneen.info/. Enter F114 in the search box to learn more about \"Pressure Injuries: Care Instructions. \" Current as of: March 20, 2017 Content Version: 11.4 © 9741-2173 Healthwise[employer] Incorporated. Care instructions adapted under license by [employer] (which disclaims liability or warranty for this information). If you have questions about a medical condition or this instruction, always ask your healthcare professional. [place] [address] any warranty or liability for your use of this information. Learning About Preventing Pressure Sores What are pressure sores? A pressure sore is an injury to the skin caused by constant pressure over a period of time.  The constant pressure blocks the blood supply to the skin. This causes skin cells to die and creates a sore. Pressure sores are also called bedsores. Pressure sores usually occur over bony areas, such as the hips, lower back, elbows, heels, and shoulders. Pressure sores can also occur in places where the skin folds over on itself, or where medical equipment presses on the skin, such as when oxygen tubes press on the ears or cheeks. Pressure sores can range from red areas on the surface of the skin to severe tissue damage that goes deep into muscle and bone. Severe sores are hard to treat and slow to heal. When pressure sores do not heal properly, problems such as bone, blood, and skin infections can develop. What causes pressure sores? Things that cause pressure sores include: · Pressure on the skin and tissues. For example, the sores may happen when a person lies in bed or sits in a wheelchair for a long time. This is the most common cause of pressure sores. · Sliding down in a bed or chair, forcing the skin to fold over itself (shear force). · Being pulled across bed sheets or other surfaces (friction burns). As we get older, our skin gets more thin and dry and less elastic, so it is easier to damage. Poor nutrition and not getting enough fluids make these natural changes in the skin worse. Skin in this condition may easily develop a pressure sore. Skin can also be damaged by sweat, feces, or urine, making pressure sores more likely and harder to heal. 
How can you help prevent pressure sores? If you are not able to do these things yourself, ask a family member or friend for help. Change position often · In a bed, change position every 2 hours. · In a wheelchair or other type of chair, shift your weight every 15 minutes, and give yourself a full relief of weight every hour. ¨ For a weight shift, lean forward and to the left and right. Push up out of the chair with your arms.  If you have a chair that tilts, use the tilt control to help relieve pressure. ¨ For a full relief of weight, stand up or move to another chair or bed if you are able to. Personal care · Check your skin every day, especially around bony areas. When a pressure sore is forming, skin temperature can be different than nearby skin. It might be warmer or cooler. The skin can feel either firmer or softer than the surrounding skin. · Keep your skin clean and free of sweat, wound drainage, urine, and feces. · Use skin lotions to keep your skin from drying out and cracking. Barrier lotions or creams have ingredients that can act as a shield to help protect the skin from moisture or irritation. · Try not to slide or slump across sheets in a chair or bed. And try not to sleep in a recliner chair. Lifestyle choices · Eat healthy foods with plenty of protein to help heal damaged skin and to help new skin grow. · Get plenty of fluids. · Stay at a healthy weight. Being either overweight or underweight can make pressure sores more likely. · If you smoke, stop. Smoking dries the skin and reduces its blood supply. If you need help quitting, talk to your doctor about stop-smoking programs and medicines. These can increase your chances of quitting for good. Ask about using cushions or pads · Overlays are special pads you put on top of a mattress. They provide a softer surface that will fit your body's shape better than a regular mattress. · Cushions or devices can be used to reduce pressure on certain areas of the body. For example, you can use a \"medical heel pillow\" to help prevent pressure sores on heels. You can also get cushions for seating surfaces, such as wheelchair seats. Talk with your doctor about cushions and pads. Some products, such as doughnut-type devices, may actually cause pressure sores or make them worse. Where can you learn more? Go to http://tina-deneen.info/. Enter 495 0791 in the search box to learn more about \"Learning About Preventing Pressure Sores. \" Current as of: March 20, 2017 Content Version: 11.4 © 2009-5627 [employer]. Care instructions adapted under license by [employer] (which disclaims liability or warranty for this information). If you have questions about a medical condition or this instruction, always ask your healthcare professional. [place] [address] any warranty or liability for your use of this information. Learning About Turning a Person in Bed Why is it important to turn a person in bed? People sometimes have to stay in bed for long periods of time. They may be very sick, in pain, or very weak and not be able to move themselves into different positions. It's very important that your loved one changes positions. Lying in one position for a long time can cause pressure injuries (also called pressure sores). Pressure injuries are damage to the skin. They can range from red areas on the surface of the skin to severe tissue damage that goes deep into muscle and bone. These problems are hard to treat and slow to heal. When pressure injuries don't heal well, they can cause problems such as bone, blood, and skin infections. Pressure injuries usually occur over bony areas, such as the hips, lower back, elbows, heels, and shoulders. They can also occur in places where the skin folds over on itself. You can help your loved one avoid pressure injuries by helping him or her turn and change position in bed. A drawsheet can help. What is a drawsheet? A drawsheet makes it easier to \"roll\" your loved one into another position. You can buy a drawsheet, or you can make one with a sheet. You then make the bed using the drawsheet. To make a drawsheet: 
· Fold a sheet in half lengthwise.  
· Place the sheet on top of the fitted bottom sheet so that the top and bottom of the drawsheet go across the bed (perpendicular to the bed). Position the drawsheet so that it will be between your loved one's head and knees. · Tuck in the drawsheet tightly on both sides. Smooth out any wrinkles to reduce possible skin irritation. How do you turn a person in bed? Positioning a drawsheet 1. When you make someone's bed with a drawsheet, position it so that it is between the person's head and knees. Turning or moving a person in bed 1. The drawsheet can then be used to turn or move the person you're caring for. What do you do after turning someone? You can use pillows to help your loved one get comfortable and avoid pressure injuries. If your loved one is on his or her side: · Place pillows in front of your loved one, at chest level, with the top arm draped over a pillow. · If needed, tuck one edge of a pillow under the buttock, lengthwise. Then fold the pillow under and tuck the other edge under the first edge. That creates a \"roll\" that stays in place better and helps keep your loved one from rolling back. · Place a pillow between your loved one's knees, with the legs slightly bent. · Put the top leg a little in front of the bottom leg. This takes pressure off the bottom leg. · Put a pillow under the bottom leg so that the bottom ankle is off the bed. If your loved one is on his or her back: 
· Put a pillow under your loved one's legs between the knees and ankles. · Do not put anything under the heels. · If you have a hospital bed, don't adjust the top end above 30 degrees. This helps prevent your loved one from sliding down. When you are finished, smooth out the drawsheet in its original position and tuck it in. Where can you learn more? Go to http://tina-deneen.info/. Enter D410 in the search box to learn more about \"Learning About Turning a Person in Bed. \" Current as of: September 24, 2016 Content Version: 11.4 © 5213-1567 Healthwise, Incorporated. Care instructions adapted under license by [employer] (which disclaims liability or warranty for this information). If you have questions about a medical condition or this instruction, always ask your healthcare professional. [address] any warranty or liability for your use of this information. Please provide this summary of care documentation to your next provider. Signatures-by signing, you are acknowledging that this After Visit Summary has been reviewed with you and you have received a copy. Patient Signature:  ____________________________________________________________ Date:  ____________________________________________________________  
  
Marcos Samaniego Provider Signature:  ____________________________________________________________ Date:  ____________________________________________________________

## 2018-05-10 NOTE — IP AVS SNAPSHOT
[address] [address] [address] [id number] 
[phone] Patient: Nickie Mckeon MRN: [id number] :1935 A check malorie indicates which time of day the medication should be taken. My Medications ASK your doctor about these medications Instructions Each Dose to Equal  
 Morning Noon Evening Bedtime  
 amLODIPine 5 mg tablet Commonly known as:  Zachary Matar Your last dose was: Your next dose is: Take 5 mg by mouth daily. 5 mg  
    
   
   
   
  
 furosemide 40 mg tablet Commonly known as:  LASIX Your last dose was: Your next dose is: Take 40 mg by mouth daily. 40 mg HYDROcodone-acetaminophen  mg tablet Commonly known as:  Billye [place] Your last dose was: Your next dose is: Take 1 Tab by mouth every four (4) hours as needed for Pain. 1 Tab  
    
   
   
   
  
 losartan 100 mg tablet Commonly known as:  COZAAR Your last dose was: Your next dose is: Take 100 mg by mouth daily. 100 mg  
    
   
   
   
  
 rosuvastatin 5 mg tablet Commonly known as:  CRESTOR Your last dose was: Your next dose is: Take 5 mg by mouth nightly. 5 mg VITAMIN D3 1,000 unit Cap Generic drug:  cholecalciferol Your last dose was: Your next dose is: Take 1,000 Units by mouth daily. 1000 Units

## 2018-05-10 NOTE — WOUND CARE
05/10/18 0852   Wound Back Right   Date First Assessed/Time First Assessed: 05/10/18 0547   POA: Yes  Wound Type: Pressure injury  Location: Back  Wound Description (Optional): #1  Orientation: Right   DRESSING STATUS Removed   DRESSING TYPE Foam   Pressure Injury Unstageable   Wound Length (cm) 6.5 cm   Wound Width (cm) 4.7 cm   Wound Depth (cm) 0.1   Wound Surface area (cm^2) 30.55 cm^2   Tissue Type Yellow;Black   Tissue Type Percent Black 75 %   Tissue Type Percent Yellow 25   Drainage Amount  Small    Drainage Color Serosanguinous   Wound Odor None   Periwound Skin Condition Erythema, blanchable   Cleansing and Cleansing Agents  Normal saline

## 2018-06-05 PROBLEM — J96.21 ACUTE ON CHRONIC RESPIRATORY FAILURE WITH HYPOXIA (HCC): Status: ACTIVE | Noted: 2018-01-01

## 2018-06-06 NOTE — ED NOTES
TRANSFER - OUT REPORT:    Verbal report given to RN on Nicole Castle  being transferred to ICU 12 (unit) for routine progression of care       Report consisted of patients Situation, Background, Assessment and   Recommendations(SBAR). Information from the following report(s) ED Summary was reviewed with the receiving nurse. Lines:   Peripheral IV Left Wrist (Active)       Peripheral IV Right Antecubital (Active)        Opportunity for questions and clarification was provided.       Patient transported with:   Registered Nurse, Respiratory Therapist, and Monitor

## 2018-06-06 NOTE — H&P
[address]  HISTORY AND PHYSICAL      Bayard Epley  MR#: [id number]  : 1935  ACCOUNT #: [de-identified]   ADMIT DATE: 2018    CHIEF COMPLAINT:  Patient does not provide. HISTORY OF PRESENT ILLNESS:  An 80-year-old white female with past medical history of chronic hypoxic respiratory failure on home oxygen, COPD, hyperlipidemia, rheumatoid arthritis, chronic pain syndrome, hyperlipidemia, presented to the emergency department from home with noted altered mental status and fatigue. The patient is a non-historian and unresponsive. She is accompanied by her daughter who notes she is the medical power of  and her son. The patient's daughter provides most of the history from the family perspective and remainder of history was obtained from review of ED and medical reports. Per the collective reports, the patient lives at home with her daughter. According to her daughter, the patient was able to talk and was at her baseline mental status this morning. She notes that at approximately 3:00 p.m., the patient was found unresponsive. The patient reportedly had increased shortness of breath. At baseline the patient has chronic hypoxic respiratory failure, is on 4 liters O2 via nasal cannula with known history of COPD. She reportedly had appeared diaphoretic and felt warm, according to the reports. ED had noted the patient \"has a cold\" and had been around \"sick people\" recently. On arrival to the emergency department patient arrived via private transport. Initial recorded vital signs, blood pressure 146/76, heart rate 121, respiration rate of 39, O2 saturation 77%. The patient was placed initially on 5 liters of oxygen via nasal cannula. Workup included chest x-ray, portable, showing atelectasis in the left lower lobe, otherwise no acute process. Labs showed elevated WBC of 15,800. Venous blood gas showed a pCO2 of 86.0 with a PvO2 of 43% on 100% nonrebreather.   ED reportedly called a code sepsis and then started the patient on cefepime 2 grams IV, levofloxacin 750 mg IV, 0.9% normal saline 1000 mL fluid bolus. Hospitalist was then called to admit the patient to the medical service. Upon arrival at the bedside, the patient appeared unresponsive. Upon evaluation of the patient in discussion with the patient's daughter, she had noted that the patient had taken hydrocodone approximately 2 tablets today which she reportedly takes for pain. Upon notification of the same, I had ordered Narcan prior to evaluation or ABG. There have been no reports of the patient having recent falls, head or neck trauma. Additional review of systems not obtained, as the patient is not responsive. PAST MEDICAL HISTORY:  Rheumatoid arthritis. COPD. Hypertension, hyperlipidemia. Solitary kidney, status post nephrectomy. Chronic hypoxic respiratory failure, on 4 liters oxygen via nasal cannula at home. Chronic pain syndrome, on hydrocodone at home. Gait abnormality. PAST SURGICAL HISTORY:  Status post nephrectomy. MEDICATIONS:  Complete medication list reviewed, noted on chart records. Taking Last Dose Start Date End Date Provider        amLODIPine (NORVASC) 5 mg tablet    --  --  Historical Provider      Take 5 mg by mouth daily.      cholecalciferol (VITAMIN D3) 1,000 unit cap    --  --  Historical Provider      Take 1,000 Units by mouth daily.      furosemide (LASIX) 40 mg tablet    --  --  Historical Provider      Take 40 mg by mouth daily.      HYDROcodone-acetaminophen (NORCO)  mg tablet    --  --  Historical Provider      Take 1 Tab by mouth every four (4) hours as needed for Pain.      losartan (COZAAR) 100 mg tablet    --  --  Historical Provider      Take 100 mg by mouth daily.      rosuvastatin (CRESTOR) 5 mg tablet    --  --  Historical Provider      Take 5 mg by mouth nightly.           ALLERGIES:  NO KNOWN DRUG ALLERGIES.     SOCIAL HISTORY:  Former smoker of cigarettes. No reports of alcohol. FAMILY HISTORY:  Heart disease, mother and sister. REVIEW OF SYSTEMS:  Unable to obtain as the patient is unresponsive. PHYSICAL EXAMINATION:  VITAL SIGNS:  Temperature 98.5 degrees Fahrenheit, blood pressure 136/66, heart rate 119, respiratory rate of 31, O2 saturation is 99% on nonrebreather. Recorded weight of 92 pounds (41.7 kg). GENERAL:  Ill-appearing elderly female in acute respiratory distress. PSYCHIATRIC:  Patient is unresponsive. NEUROLOGIC:  GCS of 6 (E1, V1, M4) with no spontaneous eye opening, no verbal response and minimally withdrawals to tactile stimuli. Generalized weakness. HEENT:  Normocephalic, atraumatic. Pupils were pinpoint. Sclerae anicteric. Conjunctivae clear. Nares are patent. Oropharynx is clear. Tongue is midline, non-edematous. NECK:  Supple, without lymphadenopathy, JVD, carotid bruits. No thyromegaly. No stridor. LYMPH:  Negative for cervical or supraclavicular adenopathy. LUNGS:  Bilateral rhonchi. HEART:  Tachycardic, regular rhythm. No murmurs, rubs or gallops. GASTROINTESTINAL:  Abdomen is soft, nontender, nondistended. Normoactive bowel sounds. No rebound, guarding, rigidity. No auscultated abdominal bruits, no palpable abdominal mass. BACK:  No step-off deformity. There is a foul smelling right scapular large wound measuring approximately 7 x 4 cm with extensive dark brown to black eschar present and surrounding erythema, warmth, edema, induration with some greenish-brown drainage on the dressings. GENITOURINARY:  There is a soft tissue mass present at the upper right inguinal region in the mons pubis region. Adams catheter is in place. MUSCULOSKELETAL:  No acute palpable bony deformity. Negative for calf tenderness. VASCULAR:  Radial 2+ and 1+ dorsalis pedis pulses without cyanosis. Positive for clubbing. Negative for edema. SKIN:  Warm and dry with exam as noted above with right scapular wound. LABORATORY DATA:  I reviewed as follows:  Sodium 140, potassium 4.0, chloride 92, CO2 of 40, BUN 20, creatinine 0.75, glucose 157, anion gap 8, calcium 9.6, GFR greater than 60, total bilirubin 0.5, total protein 8.1, albumin 3.7, ALT 24, AST 21, alkaline phosphatase is 87, lactic acid 2.0. WBC 15.8, hemoglobin 11.8, hematocrit of 40.3, MCV of 102.0, MCHC of 29.3, platelets 189, neutrophils 71%, bands of 14%. Urinalysis:  Leukocyte esterase negative, nitrites negative, urobilinogen 0.2, bilirubin negative, blood moderate, ketones negative, glucose negative, protein 30, pH 6.0, specific gravity 1.022, WBCs 0-4, RBCs 10-20, bacteria negative. ABG, pH 7.279, pCO2 greater than 90.0, pO2 of 189; bicarbonate, base excess and SpO2 were not readable per the monitor; 100% O2 nonrebreather. Chest x-ray results noted in HPI. A 12-lead EKG, I reviewed, shows sinus tachycardia, premature ventricular complexes, left ventricular hypertrophy, 114 beats per minute. ASSESSMENT AND PLAN:  1. Acute on chronic hypoxic hypercapnic respiratory failure. Patient's daughter who was at the bedside, we discussed code status which I fully discussed the patient' code status options with her daughter, Seamus Burciaga. SHE VERBALIZED UNDERSTANDING OF CODE STATUS, ADVANCE DIRECTIVE OPTIONS AND REQUESTED DNR, DO NOT RESUSCITATE, NO CODE STATUS WITH LIMITED MEDICAL INTERVENTIONS TO INCLUDE BIPAP, NON-INVASIVE VENTILATION FOR PATIENT'S CODE STATUS. Patient's daughter signed the durable do not resuscitate (DDNR) form. DNR order is placed on chart. Per this limited medical intervention, the patient was placed on BiPAP. The patient's status is considered critical.  She will be admitted to the ICU. Placed consultation with pulmonologist/intensivist upon arrival to the ICU. Placed on continuous pulse oximetry. Patient's status could deteriorate based on current condition. Have to monitor closely.   2.  SIRS (system inflammatory response syndrome). Continue the patient on broad spectrum IV antibiotics of cefepime, levofloxacin, add vancomycin. Monitor closely. 3.  Leukocytosis. Plan as noted above. Check blood cultures, may adjust antibiotics accordingly. 4.  Altered mental status. I am concerned for the patient's recent use of hydrocodone, could be some over sedation with the same. She is hypercapnic which could lead to altered mental status. Continue on BiPAP. Ordered stat dose of Narcan. Monitor closely. 5.  Right scapular back wound infection. Continue IV antibiotics as noted above. Consult wound care team, need debridement. 6.  Generalized weakness/debility. Plan as noted above. Fall precautions. 7.  Tachycardia. Continue telemetry monitoring. 8.  COPD. Order DuoNeb nebulizer treatments. 9.  Hypertension. Provide antihypertensive meds as needed. 10. VTE prophylaxis. SCDs, lower extremities. MD KAYLAH Benitez/[place]  D: 06/05/2018 23:55     T: 06/06/2018 01:10  JOB #: [id number]

## 2018-06-06 NOTE — ED PROVIDER NOTES
HPI Comments: 80 y.o. female with past medical history significant for hypertension, COPD, and hypercholesterolemia who presents ambulatory from home accompanied by daughter with chief complaint of fatigue. Patient's daughter reports that patient was minimally responsive today. Patient also has increased shortness of breath. At baseline, patient uses 4 L of NC due to COPD. Patient \"felt warm\" and had diaphoresis on the way to the ED. Patient has a cold and was around sick people recently. There are no other acute medical concerns at this time. Social hx: Previous tobacco use (quit in 46s); denies alcohol use; denies illicit drug use  PCP: Henri Gomez MD    Note written by Henna Alves, as dictated by Yuli Farias MD 9:07 PM      The history is provided by the patient and a relative. No  was used. Past Medical History:   Diagnosis Date    Autoimmune disease ([address]     RA    Chronic obstructive pulmonary disease ([address]     Hypertension     Ill-defined condition     high cholesterol    Ill-defined condition     1 kidney       Past Surgical History:   Procedure Laterality Date    HX OTHER SURGICAL      kidney removed         Family History:   Problem Relation Age of Onset    Heart Disease Mother     Heart Disease Sister        Social History     Social History    Marital status:      Spouse name: N/A    Number of children: N/A    Years of education: N/A     Occupational History    Not on file. Social History Main Topics    Smoking status: Former Smoker    Smokeless tobacco: Never Used      Comment: quit in 1980's    Alcohol use No    Drug use: No    Sexual activity: Not on file     Other Topics Concern    Not on file     Social History Narrative         ALLERGIES: Review of patient's allergies indicates no known allergies. Review of Systems   Constitutional: Positive for diaphoresis and fatigue. Negative for chills.    Respiratory: Positive for shortness of breath. Cardiovascular: Negative for chest pain. Gastrointestinal: Negative for abdominal pain, constipation, diarrhea and vomiting. Neurological: Negative for dizziness and light-headedness. All other systems reviewed and are negative. Vitals:    06/05/18 2058 06/05/18 2104   BP: 146/76    Pulse: (!) 121    Resp: (!) 39    Temp: 98.5 °F (36.9 °C)    SpO2: (!) 77% 96%   Weight: 41.7 kg (92 lb)    Height: 4' (1.219 m)             Physical Exam   Constitutional: She appears well-developed. No distress. HENT:   Head: Normocephalic and atraumatic. Eyes: Pupils are equal, round, and reactive to light. No scleral icterus. Neck: Normal range of motion. Neck supple. Cardiovascular: Regular rhythm. Tachycardia present. Pulmonary/Chest: Effort normal. Tachypnea noted. She has wheezes (expiratory). She has rhonchi. Abdominal: Soft. She exhibits no distension. There is no tenderness. There is no rebound and no guarding. Musculoskeletal: Normal range of motion. Bed sore on right back with no surrounding cellulitis   Neurological: She is alert. Skin: Skin is warm and dry. She is not diaphoretic. Psychiatric: She has a normal mood and affect. Her behavior is normal. Thought content normal.   Nursing note and vitals reviewed. Note written by Henna Sauceda, as dictated by General Risk, MD 9:07 PM      MDM  Number of Diagnoses or Management Options  Sepsis, due to unspecified organism [facility]): new and requires workup  Diagnosis management comments: Pt presents with sepsis of unknown origin. Received abx and IV fluids with improvement in vital signs - on non-rebreather for hypoxemia. Admitted to the hospital for additional management. DDX:  PNA, pericarditis, electrolyte abnormality, dehydration, influenza, UTI, pyelonephritis, gastroenteritis, diverticulitis, cholecystitis      Risk of Complications, Morbidity, and/or Mortality  General comments:  Total critical care time spent exclusive of procedures:  35 minutes      Critical Care  Total time providing critical care: 30-74 minutes        ED Course       Procedures  CONSULT NOTE:  10:17 PM Jon Martínez MD spoke with Dr. Zachary Kaur, Consult for Hospitalist.  Discussed available diagnostic tests and clinical findings. Dr. Zachary Kaur will admit patient.

## 2018-06-06 NOTE — WOUND CARE
Wound Care Note:     New consult placed by nurse request for pressure injury to right shoulder    Chart shows:  Admitted for acute on chronic respiratory failure with hypoxia   Past Medical History:   Diagnosis Date    Autoimmune disease [address]     RA    Chronic obstructive pulmonary disease ([facility]     Hypertension     Ill-defined condition     high cholesterol    Ill-defined condition     1 kidney      Wound Culture obtained today for right scapula  WBC = 14.8 on 6/6/18  Admitted from home    Assessment:   Patient is alert and talking, incontinent with some assistance needed in repositioning. Bed: Rula Gain on [employer] will be ordered  Patient wearing briefs for incontinence and has a Adams. Diet: NPO    Bilateral heels and buttocks skin intact and without erythema. 1. POA unstageable pressure injury to right scapula measuring 7 cm x 4.5 cm x 0.1 cm, wound bed is 95% black eschar, and 5% pink and tan slough, small sero/sang drainage, madelin-wound with blanchable erythema with greatest redness being on medial side measuring 2.5 cm, wound edges are open. Wound culture taken at bedside. Hydrocolloid applied. 2.  POA stage IV sacral pressure injury measures 2 cm  X 1.5 cm x 1.5 cm, wound bed is 60% slough and 40% pink, small sero/sang drainage, madelin-wound with blanchable erythema, wound edges are open. Wound packed with wet to dry dressing. 3.  POA stage II pressure injury to right side of nose measures 0.2 cm x 0.2 cm x 0.1 cm, wound bed is pink, non-blanchable, no drainage, madelin-wound intact, wound edges are open. [place] applied. Spoke with Dr. Brad Lemus, notified of POA pressure injuries; wound care orders obtained. Patient repositioned on left side   Heels offloaded on pillow. Recommendations:    Right upper back- Please maintain Duoderm up to one week or change as needed with soiling or rolled edges.   Please use adhesive remover wipes when changing. This may need a surgical debridement if the hydrocolloid does not soften the eschar. Sacrum- Daily cleanse with normal saline, wipe wound bed clean and dry, loosely fill wound with 1/4 inch packing strips, cover with 4 x 4's and tape to secure. Nose- Every 3 days and as needed apply [place]. Envision bed ordered for patient through [facility]. Please call [facility] for any issues or when patient discharged at ext. [phone]. Confirmation #[id number]  Ensure both frame and blower box at foot of bed are plugged in - blower box will have green glowing light when air surface is on (should be on at all times). Use only flat sheet and one incontinence pad. Skin Care & Pressure Prevention:  Minimize layers of linen/pads under patient to optimize support surface. Turn/reposition approximately every 2 hours and offload heels.   Manage incontinence / promote continence     Discussed above plan with patient & CEASAR Do    Transition of Care: Plan to follow as needed while admitted to hospital.    Channie Dross" Veleta Hamman, BSN, RN, [facility]  office [phone]  pager [phone] or call  to page

## 2018-06-06 NOTE — PROGRESS NOTES
TRANSFER - IN REPORT:    Verbal report received from Mone Rowe RN (name) on Min Borrero  being received from ED (unit) for routine progression of care      Report consisted of patients Situation, Background, Assessment and   Recommendations(SBAR). Information from the following report(s) SBAR, Kardex, ED Summary, Intake/Output, MAR, Recent Results, Med Rec Status and Cardiac Rhythm Sinus Tach was reviewed with the receiving nurse. Opportunity for questions and clarification was provided. Assessment completed upon patients arrival to unit and care assumed.      Primary Nurse Stalin Austin RN and Remy Fuller RN performed a dual skin assessment on this patient Impairment noted- see wound doc flow sheet  Josue score is 15

## 2018-06-06 NOTE — PROGRESS NOTES
Pulmonary, Critical Care, and Sleep Medicine~Progress Note    Name: Jens Starr MRN: [id number]   : 1935 Hospital: [facility]    Date: 2018 9:05 AM Admission: 2018     Impression Plan   1. Acute (on chronic?) hypercapnic resp with chronic hypoxia (on 4LPM at home) secondary to below + opioid   2. Abnormal chest x-ray: LLL infiltrate suspect PNA  3. Reported COPD, unknown FEV1 (former smoker 10-20 for years, stopped many decades)   4. Right scapular back wound infection   5. Debility, generalized  6. HTN  1. Conflicting code status. Dr Steele Class note stated patient is a DNR, but the code status on the chart states full code. Defer to hospitalist.  2. Wound care  3. NIV, if she continues to do well with mental status please take off NIV this afternoon, qhs and prn  4. Low output in urine, start fluids   5. ECHO  6. IV steroids  7. Follow cultures  8. May require SLP evaluation   9. Will need PT/OT    10. Duonebs/brovana/pulmicort  11. Currently on cefepime and levaquin  12. Discussed with RN and attending. Can go to IMCU   13. Will need formal PFTs in a few weeks     Daily Progression:    Acute onset of dyspnea yesterday. Mental status improving with NIV. No recent hospitalizations     I have reviewed the labs and previous days notes. A comprehensive review of systems was negative.     OBJECTIVE:     Vital Signs:       Visit Vitals    /63    Pulse (!) 117    Temp 99.5 °F (37.5 °C)    Resp (!) 41    Ht 4' (1.219 m)    Wt 46.3 kg (102 lb)    SpO2 94%    BMI 31.13 kg/m2      Temp (24hrs), Av.2 °F (37.3 °C), Min:97 °F (36.1 °C), Max:100.3 °F (37.9 °C)     Intake/Output:     Last shift:  07 -  1900  In: 150 [I.V.:150]  Out: 30 [Urine:30]    Last 3 shifts:  190 -  0700  In: 396.3 [I.V.:396.3]  Out: 295 [Urine:295]        Intake/Output Summary (Last 24 hours) at 18 09  Last data filed at 06/06/18 0800   Gross per 24 hour   Intake           546.25 ml   Output              325 ml   Net           221.25 ml       Physical Exam:                                        Exam Findings Other   General: No resp distress noted, appears stated age    [de-identified]:  No ulcers, JVD not elevated, no cervical LAD    Chest: No pectus deformity, normal chest rise b/l    HEART:  Tachy, RR, no murmurs/rubs/gallops    Lungs:  diminished BS at bases    ABD: Soft/NT, non rigid mildly distended    EXT: No cyanosis/clubbing/edema, normal peripheral pulses    Skin: No rashes or ulcers, no mottling    Neuro: A/O         Medications:  Current Facility-Administered Medications   Medication Dose Route Frequency    sodium chloride (NS) flush 5-10 mL  5-10 mL IntraVENous Q8H    sodium chloride (NS) flush 5-10 mL  5-10 mL IntraVENous PRN    sodium chloride (NS) flush 5-10 mL  5-10 mL IntraVENous PRN    cefepime (MAXIPIME) 2 g in 0.9% sodium chloride (MBP/ADV) 100 mL  2 g IntraVENous Q12H    levoFLOXacin (LEVAQUIN) 750 mg in D5W IVPB  750 mg IntraVENous Q48H    0.9% sodium chloride infusion  75 mL/hr IntraVENous CONTINUOUS    naloxone (NARCAN) injection 0.4 mg  0.4 mg IntraVENous NOW       Labs:  ABG Recent Labs      06/06/18   0509  06/05/18   2311   PHI  7.417  7.279*   PCO2I  64.7*  >90.0*   PO2I  88  189*   HCO3I  41.7*   --    SO2I  96   --    FIO2I  0.70   --         CBC Recent Labs      06/06/18   0541  06/05/18   2112   WBC  14.8*  15.8*   HGB  10.1*  11.8   HCT  34.2*  40.3   PLT  219  243   MCV  100.6*  102.0*   MCH  29.7  91.2        Metabolic  Panel Recent Labs      06/06/18   0541  06/05/18   2112   NA  141  140   K  4.0  4.0   CL  98  92*   CO2  37*  40*   GLU  118*  157*   BUN  15  20   CREA  0.54*  0.75   CA  9.0  9.6   ALB   --   3.7   SGOT   --   21   ALT   --   24        Pertinent Labs                AUBREE Nicholson  6/6/2018

## 2018-06-06 NOTE — ED TRIAGE NOTES
Arrivef from home with daughter. Daughter noticed patient not talking and walking and more SOB. Patient has area to right shoulder. RN had to lift patient out of car. Due to POX  RN placed patient in room. Patient is from [address] [place] [address], but now leaves with daughter. Per  Daughter patient on 4L NC normally. Patent arrived with home oxygen.

## 2018-06-06 NOTE — PROGRESS NOTES
TRANSFER - OUT REPORT:    Verbal report given to dontrell(name) on Lesvia Adams  being transferred to [facility](unit) for routine progression of care       Report consisted of patients Situation, Background, Assessment and   Recommendations(SBAR). Information from the following report(s) SBAR, Kardex and MAR was reviewed with the receiving nurse. Lines:   Peripheral IV Left Wrist (Active)   Site Assessment Clean, dry, & intact 6/6/2018 12:00 PM   Phlebitis Assessment 0 6/6/2018 12:00 PM   Infiltration Assessment 0 6/6/2018 12:00 PM   Dressing Status Clean, dry, & intact 6/6/2018 12:00 PM   Dressing Type Transparent 6/6/2018 12:00 PM   Hub Color/Line Status Pink;Capped 6/6/2018 12:00 PM   Action Taken Open ports on tubing capped 6/6/2018  8:00 AM   Alcohol Cap Used Yes 6/6/2018  8:00 AM       Peripheral IV 06/06/18 Right Wrist (Active)   Site Assessment Clean, dry, & intact 6/6/2018 12:00 PM   Phlebitis Assessment 0 6/6/2018 12:00 PM   Infiltration Assessment 0 6/6/2018 12:00 PM   Dressing Status Clean, dry, & intact 6/6/2018 12:00 PM   Dressing Type Transparent 6/6/2018 12:00 PM   Hub Color/Line Status Pink; Infusing 6/6/2018 12:00 PM   Action Taken Open ports on tubing capped 6/6/2018 12:00 PM   Alcohol Cap Used Yes 6/6/2018 12:00 PM        Opportunity for questions and clarification was provided.       Patient transported with:   O2 @ bipap liters  Registered Nurse  [facility]

## 2018-06-06 NOTE — PROGRESS NOTES
Primary Nurse Maryam Haider RN and CEASAR Davila performed a dual skin assessment on this patient Impairment noted- see wound doc flow sheet  Has saccral ulcer,right shoulder ulcer noted ,wound care consulted

## 2018-06-07 NOTE — PROGRESS NOTES
Spiritual Care Assessment/Progress Note  [address]      NAME: Svetlana Ochoa      MRN: [id number]  AGE: 80 y.o. SEX: female  Orthodox Affiliation: No Pentecostalism   Language:      6/7/2018     Total Time (in minutes): 15     Spiritual Assessment begun in [facility] IMCU through conversation with:         []Patient        [x] Family    [] Friend(s)        Reason for Consult: Palliative Care, Initial/Spiritual Assessment     Spiritual beliefs: (Please include comment if needed)     [x] Identifies with a mariana tradition:     [] Supported by a mariaan community:      [] Claims no spiritual orientation:      [] Seeking spiritual identity:           [] Adheres to an individual form of spirituality:      [] Not able to assess:                     Identified resources for coping:      [x] Prayer                               [] Music                  [] Guided Imagery     [x] Family/friends                 [] Pet visits     [] Devotional reading                         [] Unknown     [] Other:*                                              Interventions offered during this visit: (See comments for more details)          Family/Friend(s): Affirmation of emotions/emotional suffering, Affirmation of mariana, Prayer (assurance of), Normalization of emotional/spiritual concerns     Plan of Care:     [] Support spiritual and/or cultural needs    [] Support AMD and/or advance care planning process      [] Support grieving process   [] Coordinate Rites and/or Rituals    [] Coordination with community clergy   [] No spiritual needs identified at this time   [] Detailed Plan of Care below (See Comments)  [] Make referral to Music Therapy  [] Make referral to Pet Therapy     [] Make referral to Addiction services  [] Make referral to [facility]  [] Make referral to Spiritual Care Partner  [] No future visits requested        [x] Follow up visits as needed     Comments:  visit per palliative consult.  Pt was in bed and did not respond, pt's daughter Maricruz Jerez and pt's son Jacqueline Lama, were at bedside. Maricruz Jerez was on the phone with the pt's grand daughter Lianne Beebe. Maricruz Jerez asked if I would talk to Lianne Beebe, provided pastoral listening and support. Provided support with family present. Family is feeling overwhelmed and not sure what they need to do or what kind of support. Maricruz Jerez mentioned that pt would be going back up to the ICU, and feeling like she wished she would have stayed with her mom overnight as that is when the pt seemed to changed and not be as responsive. Let them know of  availability.  follow up as needed.      David Tao, [place] [phone])

## 2018-06-07 NOTE — CONSULTS
Palliative Medicine Consult  Jay: [phone])    Patient Name: Arik Vickers  YOB: 1935    Date of Initial Consult: June 7, 2018  Reason for Consult:  Care Decisions; end stage COPD on bipap for PNA not doing well, consider hospice care  Requesting Provider: Dr. Raul Rahman  Primary Care Physician: Johny Mckeon MD     SUMMARY:   Arik Vickers is a 80 y.o. with a past history of chronic hypoxic respiratory failure on home oxygen, COPD, HLD, HTN, RA, chronic pain syndrome, who was admitted on 6/5/2018 from home due to [address] with a diagnosis of acute on chronic hypoxic hypercapnic respiratory failure, SIRS with leukocytosis, metabolic encephalopathy, rt scapular back wound infection, generalized weakness, debility, tachycardia. Pt minimally responsive on bipap. Comfort measures recommended if pt fails to improve in the upcoming days. Current medical issues leading to Palliative Medicine involvement include: care decisions given high risk of mortality. 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 Shortness of breath  2. Obtunded  3. Feeding difficulties   4. Physical Debility      PLAN:   1. Met with the patient's daughter, Teto Pichardo and son  2. Explained the role of Palliative and the hope to support during this difficult time  3. Acknowledged the change in the patient's condition and risk of further decline  4. Discussed that some difficult decisions may be needed if the pt fails to improve. Prepared the family that this event could limit the patient's life and comfort measures may be the best way to honor the patient. DNR status affirmed  5. Family verbalized an understanding  6. Will continue to follow and provide ongoing support. Continue current level of care  7. Initial consult note routed to primary continuity provider  8.  Communicated plan of care with: Palliative IDT       GOALS OF CARE / TREATMENT PREFERENCES:     GOALS OF CARE:  Patient/Health Care Proxy Stated Goals: Other (comment) (continue current level of care)      TREATMENT PREFERENCES:   Code Status: DNR    Advance Care Planning:  Advance Care Planning 5/10/2018   Patient's Healthcare Decision Maker is: Named in scanned ACP document   Primary Decision Maker Name Oliver Carballo. Lee   Primary Decision Maker Phone Number [phone]   Primary Decision Maker Relationship to Patient Adult child   Confirm Advance Directive None   Patient Would Like to Complete Advance Directive No   Does the patient have other document types Power of        Medical Interventions: Limited additional interventions   Other Instructions: Other:    As far as possible, the palliative care team has discussed with patient / health care proxy about goals of care / treatment preferences for patient. HISTORY:     History obtained from: family, chart, team    CHIEF COMPLAINT: obtunded    HPI/SUBJECTIVE:    The patient is:   [] Verbal and participatory  [x] Non-participatory due to:   obtunded    Clinical Pain Assessment (nonverbal scale for severity on nonverbal patients): Activity (Movement): Lying quietly, normal position    Duration: for how long has pt been experiencing pain (e.g., 2 days, 1 month, years)  Frequency: how often pain is an issue (e.g., several times per day, once every few days, constant)     FUNCTIONAL ASSESSMENT:     Palliative Performance Scale (PPS):  PPS: 30       PSYCHOSOCIAL/SPIRITUAL SCREENING:     Palliative IDT has assessed this patient for cultural preferences / practices and a referral made as appropriate to needs (Cultural Services, Patient Advocacy, Ethics, etc.)    Advance Care Planning:  Advance Care Planning 5/10/2018   Patient's Healthcare Decision Maker is: Named in scanned ACP document   Primary Decision Maker Name Oliver Carballo.  Lee   Primary Decision [employer] Number [phone]   Primary Decision Maker Relationship to Patient Adult child   Confirm Advance Directive None   Patient Would Like to Complete Advance Directive No   Does the patient have other document types Power of        Any spiritual / Presybeterian concerns:  [] Yes /  [x] No    Caregiver Burnout:  [] Yes /  [x] No /  [] No Caregiver Present      Anticipatory grief assessment:   [x] Normal  / [] Maladaptive       ESAS Anxiety:      ESAS Depression:          REVIEW OF SYSTEMS:     Positive and pertinent negative findings in ROS are noted above in HPI. The following systems were [] reviewed / [x] unable to be reviewed as noted in HPI  Other findings are noted below. Systems: constitutional, ears/nose/mouth/throat, respiratory, gastrointestinal, genitourinary, musculoskeletal, integumentary, neurologic, psychiatric, endocrine. Positive findings noted below. Modified ESAS Completed by: provider                                            PHYSICAL EXAM:     From RN flowsheet:  Wt Readings from Last 3 Encounters:   06/07/18 103 lb 1.6 oz (46.8 kg)     Blood pressure 153/70, pulse 84, temperature 97.2 °F (36.2 °C), resp. rate 29, height 4' (1.219 m), weight 103 lb 1.6 oz (46.8 kg), SpO2 100 %.     Pain Scale 1: Adult Nonverbal Pain Scale  Pain Intensity 1: 0              Pain Intervention(s) 1: Repositioned  Last bowel movement, if known:     Constitutional: unresponsive  Eyes: closed  ENMT: no nasal discharge, moist mucous membranes  Cardiovascular: regular rhythm  Respiratory: breathing not labored on NRB, symmetric  Gastrointestinal: soft non-tender, +bowel sounds  Musculoskeletal: scoliosis   Skin: rt scapular wound  Neurologic: obtunded   Psychiatric:   Other:       HISTORY:     Principal Problem:    Acute on chronic respiratory failure with hypoxia ([facility] (6/5/2018)      Past Medical History:   Diagnosis Date    Autoimmune disease ([facility]     RA    Chronic obstructive pulmonary disease ([facility]     Hypertension     Ill-defined condition     high cholesterol    Ill-defined condition     1 kidney      Past Surgical History:   Procedure Laterality Date    HX OTHER SURGICAL      kidney removed      Family History   Problem Relation Age of Onset    Heart Disease Mother     Heart Disease Sister       History reviewed, no pertinent family history.   Social History   Substance Use Topics    Smoking status: Former Smoker    Smokeless tobacco: Never Used      Comment: quit in 1980's    Alcohol use No     No Known Allergies   Current Facility-Administered Medications   Medication Dose Route Frequency    Vancomycin- Pharmacy Dosing   Other Rx Dosing/Monitoring    [START ON 6/8/2018] vancomycin (VANCOCIN) 750 mg in 0.9% sodium chloride (MBP/ADV) 250 mL  750 mg IntraVENous Q24H    sodium chloride (NS) flush 5-10 mL  5-10 mL IntraVENous Q8H    sodium chloride (NS) flush 5-10 mL  5-10 mL IntraVENous PRN    methylPREDNISolone (PF) (SOLU-MEDROL) injection 40 mg  40 mg IntraVENous Q8H    dextrose 5 % - 0.45% NaCl infusion  50 mL/hr IntraVENous CONTINUOUS    albuterol-ipratropium (DUO-NEB) 2.5 MG-0.5 MG/3 ML  3 mL Nebulization QID RT    budesonide (PULMICORT) 500 mcg/2 ml nebulizer suspension  500 mcg Nebulization BID RT    arformoterol (BROVANA) neb solution 15 mcg  15 mcg Nebulization BID RT    sodium chloride (NS) flush 5-10 mL  5-10 mL IntraVENous PRN    cefepime (MAXIPIME) 2 g in 0.9% sodium chloride (MBP/ADV) 100 mL  2 g IntraVENous Q12H    levoFLOXacin (LEVAQUIN) 750 mg in D5W IVPB  750 mg IntraVENous Q48H          LAB AND IMAGING FINDINGS:     Lab Results   Component Value Date/Time    WBC 14.8 (H) 06/06/2018 05:41 AM    HGB 10.1 (L) 06/06/2018 05:41 AM    PLATELET 484 61/58/6826 05:41 AM     Lab Results   Component Value Date/Time    Sodium 141 06/06/2018 05:41 AM    Potassium 4.0 06/06/2018 05:41 AM    Chloride 98 06/06/2018 05:41 AM    CO2 37 (H) 06/06/2018 05:41 AM    BUN 15 06/06/2018 05:41 AM Creatinine 0.54 (L) 06/06/2018 05:41 AM    Calcium 9.0 06/06/2018 05:41 AM      Lab Results   Component Value Date/Time    AST (SGOT) 21 06/05/2018 09:12 PM    Alk. phosphatase 87 06/05/2018 09:12 PM    Protein, total 8.1 06/05/2018 09:12 PM    Albumin 3.7 06/05/2018 09:12 PM    Globulin 4.4 (H) 06/05/2018 09:12 PM     No results found for: INR, PTMR, PTP, PT1, PT2, APTT   No results found for: IRON, FE, TIBC, IBCT, PSAT, FERR   No results found for: PH, PCO2, PO2  No components found for: GLPOC   No results found for: CPK, CKMB             Total time: 70 minutes  Counseling / coordination time, spent as noted above: 55 minutes  > 50% counseling / coordination?: y    Prolonged service was provided for  []30 min   []75 min in face to face time in the presence of the patient, spent as noted above. Time Start:   Time End:   Note: this can only be billed with 37172 (initial) or 55406 (follow up). If multiple start / stop times, list each separately.

## 2018-06-07 NOTE — PROGRESS NOTES
Reason for Admission:   Acute on chronic respiratory failure, severe malnutrition               RRAT Score:     5 but should be higher due to state of health             Resources/supports as identified by patient/family:   Lives with daughter, moved recently from [place], [place]. Top Challenges facing patient (as identified by patient/family and CM): Finances/Medication cost?      No issues. Transportation? Daughter provides whatever transportation is needed. Support system or lack thereof? Lives with daughter. Living arrangements? Lives with daughter. Self-care/ADLs/Cognition? Patient needs help. Right now she is minimally responsive, on continuous bipap, has severe malnutrition from not eating, has muscular and ocular wasting. She is on broad spectrum IV ABX due to SIRS. She has a right scapular back wound infection and a sacral wound. Current Advanced Directive/Advance Care Plan:  Her daughter is NOK. She is a DNR but does not have an AMD.                          Plan for utilizing home health:    TBD. Possible hospice. If hospice not chosen at this time, patient will need PT/OT evaluations.                       Likelihood of readmission:                  Transition of Care Plan:

## 2018-06-07 NOTE — PROGRESS NOTES
TRANSFER - IN REPORT:    Verbal report received from jessie george(name) on Jairon Adams  being received from [facility](unit) for routine progression of care      Report consisted of patients Situation, Background, Assessment and   Recommendations(SBAR). Information from the following report(s) SBAR, Kardex and MAR was reviewed with the receiving nurse. Opportunity for questions and clarification was provided. Assessment completed upon patients arrival to unit and care assumed.

## 2018-06-07 NOTE — PROGRESS NOTES
NUTRITION COMPLETE ASSESSMENT    RECOMMENDATIONS:   1. Offer PO once cleared by SLP & MD for safe intake    2. IF diet order is placed, begin Ensure Compact at all meals    3. Consult RD if family/patient desire Nutrition Support (not recommended at this time by RD)     Interventions/Plan:   Food/Nutrient Delivery:            begin ONS with all meals if/when PO is appropriate     Nutrition Education:     Coordination of Care: Collaboration with other providers    Assessment:   Reason for Assessment:   [] Provider Consult  [x]BPA/MST Referral   []LOS   []Reassessment   []NPO/Clear Liquid   [x]At Nutrition Risk  []Other    Diet: NPO  Supplements: Ensure BID (PTA at home)  Nutritionally Significant Medications: [x] Reviewed & Includes:   Meal Intake: No data found. Pre-Hospitalization:  Usual Appetite: Poor    Current Hospitalization:   Fluid Restriction:   n/a  Appetite: Poor  PO Ability:   Con't BiPap Average po intake:NPO  Average supplements intake:   n/a      Subjective:  Pt is minimally responsive    Objective:  Pt was found unresponsive at home yesterday afternoon. Pt is currently on continuous Bi-pap and minimally responsive. Past Medical History: chronic hypoxic respiratory failure on home oxygen, COPD, hyperlipidemia, rheumatoid arthritis, chronic pain syndrome, hyperlipidemia. Pt has a Right scapular wound. Pt appears to have had a recent 7# wt loss noted last month per wound care clinic note. She appears thin, pale, cachectic with temporal wasting & wound. Pt with chronic severe malnutrition present on admission. Nutrition support is not recommended at this time except for comfort & as desired should pt become alert enough for PO. Estimated Nutrition Needs:   Kcals/day: 1200 Kcals/day  Protein: 60 g ( - 70g (1.3-1.5 g/kg of current wt)  Fluid: 1170 ml (25 kcal/kg of current wt)  Based On: Other (Comment) (minimal calories to meet RDI)  Weight Used:  Other (comment)    Pt expected to meet estimated nutrient needs:  []   Yes     [x]  No [] Unable to predict at this time    Nutrition Diagnosis:   1. Predicted suboptimal energy intake related to NPO as evidenced by continuous Bipap, limited alertness, unstagable wound on back, unable to take PO    2. Patient meets criteria for Chronic Severe Protein Calorie Malnutrition as evidenced by:   ASPEN Malnutrition Criteria  Acute Illness, Chronic Illness, or Social/Enviornmental: Chronic illness  Energy Intake: Less than/equal to 75% of est energy req for greater than/equal to 1 month  Body Fat: Severe  Muscle Mass: Severe  ASPEN Malnutrition Score - Chronic Illness: 18  Chronic Illness - Malnutrition Diagnosis: Severe malnutrition. Weight loss of at least 6.4% from UBW over past 4-6 weeks, minimal fat stores available, muscle wasting in extremities, wound - unstagable, ocular wasting. Goals:     meet calorie/protein needs within 3 days      Monitoring & Evaluation:    - Total energy intake   - Weight/weight change   -      Previous Nutrition Goals Met:   N/A    Previous Recommendations:    N/A    Education & Discharge Needs:   [x] None Identified   [] Identified and addressed    [x] Participated in care plan, discharge planning, and/or interdisciplinary rounds        Cultural, Adventist and ethnic food preferences identified: None    Skin Integrity: []Intact  [x]Other - see WC flow sheet  Edema: [x]None []Other  Last BM: PTA  Food Allergies: [x]None []Other  Diet Restrictions: Cultural/Anabaptism Preference(s): None     Anthropometrics:    Weight Loss Metrics 6/7/2018   Today's Wt 103 lb 1.6 oz   BMI 31.46 kg/m2      Weight Source: Bed  Height: 4' (121.9 cm),    Body mass index is 31.46 kg/(m^2).   IBW : 45.4 kg (100 lb),     ,      Labs:    Lab Results   Component Value Date/Time    Sodium 141 06/06/2018 05:41 AM    Potassium 4.0 06/06/2018 05:41 AM    Chloride 98 06/06/2018 05:41 AM    CO2 37 (H) 06/06/2018 05:41 AM    Glucose 118 (H) 06/06/2018 05:41 AM    BUN 15 06/06/2018 05:41 AM    Creatinine 0.54 (L) 06/06/2018 05:41 AM    Calcium 9.0 06/06/2018 05:41 AM    Albumin 3.7 06/05/2018 09:12 PM     No results found for: HBA1C, HGBE8, VLL9COQJ, TKN6FUBM, ZTO2NGOZ      Noah Busby, RD, MS, CDE

## 2018-06-07 NOTE — PROGRESS NOTES
Pt taken off Bipap due to small amount of vomit in the mask. Placed on 4 lpm NC for Hypoxia and low saturation post Bipap. NTS done. Pt able to cough congested wet cough noted. Pt left stable with no distress noted at this time.

## 2018-06-07 NOTE — CDMP QUERY
There is documentation of \"SIRS with leukocytosis,\"  \"Right scapular back wound infection,\" and separately \"LLL infiltrate suspect PNA\" noted on this chart. Could this be further clarified as:    => Likely Sepsis POA in the setting of Right scapular back wound infection and possible PNA requiring fluid bolus, Levaquin, Vanc, CXR, blood and wound cx  => Other explanation of clinical findings  => Clinically Undetermined (no explanation for clinical findings)    The medical record reflects the following clinical findings, treatment, and risk factors. Risk Factors:  chronic hypoxic respiratory failure on home oxygen, COPD, R scapular wound   Clinical Indicators:  H&P- SIRS (system inflammatory response syndrome; Right scapular back wound infection; 6/6 Pulm note- Abnormal chest x-ray: LLL infiltrate suspect PNA  Treatment: fluid bolus, Levaquin, Vanc, CXR, blood and wound cx    Please clarify and document your clinical opinion in the progress notes and discharge summary including the definitive and/or presumptive diagnosis, (suspected or probable), related to the above clinical findings. Please include clinical findings supporting your diagnosis.     Thank you,    Helena Crockett RN  [facility]  [phone]

## 2018-06-07 NOTE — PROGRESS NOTES
Bedside shift change report given to [address] (oncoming nurse) by LILLIAN Davis (offgoing nurse). Report included the following information SBAR, Kardex, Procedure Summary, MAR and Recent Results.

## 2018-06-07 NOTE — PROGRESS NOTES
Pharmacist Note - Vancomycin Dosing    Consult provided for this 80 y.o. female for indication of right scapular back wound infection  Antibiotic regimen(s): Vanc + Cefepime+ Levaquin    Recent Labs      18   0541  18   2112   WBC  14.8*  15.8*   CREA  0.54*  0.75   BUN  15  20     Frequency of BMP: Daily until 6/10  Height: 121.9 cm  Weight: 46.8 kg  Est CrCl: 48.3 ml/min; UO: 0.6 ml/kg/hr  Temp (24hrs), Av.3 °F (36.8 °C), Min:97.2 °F (36.2 °C), Max:99 °F (37.2 °C)    Cultures:   Blood-NGTD   Back wound drainage- moderate possible Staph aureus; moderate diptheroids (pending)    Goal trough = 10 - 15 mcg/mL    Therapy will be initiated with a loading dose of 1250 mg IV x 1 to be followed by a maintenance dose of 750 mg IV every 24 hours. Pharmacy to follow patient daily and order levels / make dose adjustments as appropriate.     Alexandria Farley, JannieD, BCPS

## 2018-06-07 NOTE — PROGRESS NOTES
5:50 - pt coughed up sputum (discolored: green&brown) in Bipap mask, pt sounds more congested than before, pt restless and picking but does not seem more confused than before. Pt temporally taking off Bipap and placed on 6L NC and then decreased to 4L on NC. Hospitalist and RT paged. Pt able to cough, cough is wet and congested. RT at beside. Pt suctioned and evaluated. Orders given by Hospitalist for ABG and Chest xray. Pt placed back on Bipap. Pt O2 desated as low as 84% when off Bipap. Bedside and Verbal shift change report given to Chino Landrum (oncoming nurse) by NSH Holdco (offgoing nurse). Report included the following information SBAR, Kardex, Intake/Output, MAR, Accordion and Recent Results. Problem: Pressure Injury - Risk of  Goal: *Prevention of pressure injury  Document Josue Scale and appropriate interventions in the flowsheet. Outcome: Progressing Towards Goal  Pressure Injury Interventions:  Sensory Interventions: Assess changes in LOC, Assess need for specialty bed, Avoid rigorous massage over bony prominences, Check visual cues for pain, Discuss PT/OT consult with provider, Float heels, Keep linens dry and wrinkle-free, Maintain/enhance activity level, Minimize linen layers, Monitor skin under medical devices, Pressure redistribution bed/mattress (bed type), Turn and reposition approx. every two hours (pillows and wedges if needed)         Activity Interventions: Assess need for specialty bed, Increase time out of bed, Pressure redistribution bed/mattress(bed type), PT/OT evaluation    Mobility Interventions: Assess need for specialty bed, HOB 30 degrees or less, Float heels, Pressure redistribution bed/mattress (bed type), PT/OT evaluation, Turn and reposition approx.  every two hours(pillow and wedges)    Nutrition Interventions: Document food/fluid/supplement intake, Discuss nutritional consult with provider, Offer support with meals,snacks and hydration    Friction and Shear Interventions: Apply protective barrier, creams and emollients, Foam dressings/transparent film/skin sealants, HOB 30 degrees or less, Lift sheet, Lift team/patient mobility team, Minimize layers, Transferring/repositioning devices               Problem: Falls - Risk of  Goal: *Absence of falls  Outcome: Progressing Towards Goal  Pt without fall during stay    Problem: Impaired Skin Integrity/Pressure Injury Treatment  Goal: *Improvement of Existing Pressure Injury  Outcome: Progressing Towards Goal  Pt has 3 note pressure injuries present on admission, Wound care consulted and following.

## 2018-06-07 NOTE — PROGRESS NOTES
Pulmonary, Critical Care, and Sleep Medicine~Progress Note    Name: Te Brambila MRN: [id number]   : 1935 [facility]   Date: 2018 9:05 AM Admission: 2018     Impression Plan   1. Acute (on chronic?) hypercapnic resp with chronic hypoxia (on 4LPM at home) secondary to below + opioid   2. Abnormal chest x-ray: LLL infiltrate suspect PNA  3. Reported COPD, unknown FEV1 (former smoker 10-20 for years, stopped many decades)   4. Right scapular back wound infection: prelim + for staph  5. Debility, generalized  6. HTN  1. Continue BiPap to keep sats >90% and for increased WOB  2. Wound care  3. Continue IV fluids  4. Check labs  5. IV steroids  6. Follow cultures  7. Contact precautions for now  8. May require SLP evaluation: continue NPO whie on BiPap  9. Will need PT/OT    10. ABG  11. Duonebs/brovana/pulmicort  12. Currently on cefepime and levaquin: add Vanc  13. Discussed with RN   14. Will need formal PFTs in a few weeks  15. If she doesn't improve in next 24 hours may need to have palliative on board     Patient is critically ill requiring continuous Bipap for acute respiratory failure: Total CC time: 45 minutes EOP and without provider overlap. Daily Progression:    Overniht Events    Requiring NIV at all times: placed off onto nasal cannula yesterday but desaturated quickly  Afebrile  O2 sats 100% on BiPap 16/6, 70% FiO2  Fluid balance: -141  No new AM labs  Wound culture with possible staph and moderate diphtheroids: prelim  ECHO: EF 45%, no regional wall abnormalities, akinesis of the apical septal and apical wall    ROS    RN reports that she has been pretty sleepy overnight: opens eyes to voice and follows commands at times. Otherwise she can't give me any history. I have reviewed the labs and previous days notes. A comprehensive review of systems was negative.     OBJECTIVE:     Vital Signs:        Visit Vitals    /73 (BP 1 Location: Right arm, BP Patient Position: At rest)    Pulse 74    Temp 98.2 °F (36.8 °C)    Resp 27    Ht 4' (1.219 m)    Wt 46.8 kg (103 lb 1.6 oz)    SpO2 100%    BMI 31.46 kg/m2      Temp (24hrs), Av.8 °F (37.1 °C), Min:98.2 °F (36.8 °C), Max:99.8 °F (37.7 °C)     Intake/Output:     Last shift:      Last 3 shifts:  190 -  0700  In: 939.6 [I.V.:939.6]  Out: 920 [Urine:920]          Intake/Output Summary (Last 24 hours) at 18 1003  Last data filed at 18 0620   Gross per 24 hour   Intake           393.33 ml   Output              535 ml   Net          -141.67 ml       Physical Exam:                                        Exam Findings Other   General: On BiPap, in no acute distress, appears stated age    [de-identified]:  No ulcers, JVD not elevated, no cervical LAD    Chest: No pectus deformity, normal chest rise b/l    HEART:  Tachy, RR, no murmurs/rubs/gallops    Lungs:  diminished BS at bases    ABD: Soft/NT, non rigid mildly distended    EXT: No cyanosis/clubbing/edema, normal peripheral pulses    Skin: No rashes or ulcers, no mottling    Neuro: A/O         Medications:  Current Facility-Administered Medications   Medication Dose Route Frequency    sodium chloride (NS) flush 5-10 mL  5-10 mL IntraVENous Q8H    sodium chloride (NS) flush 5-10 mL  5-10 mL IntraVENous PRN    methylPREDNISolone (PF) (SOLU-MEDROL) injection 40 mg  40 mg IntraVENous Q8H    dextrose 5 % - 0.45% NaCl infusion  50 mL/hr IntraVENous CONTINUOUS    albuterol-ipratropium (DUO-NEB) 2.5 MG-0.5 MG/3 ML  3 mL Nebulization QID RT    budesonide (PULMICORT) 500 mcg/2 ml nebulizer suspension  500 mcg Nebulization BID RT    arformoterol (BROVANA) neb solution 15 mcg  15 mcg Nebulization BID RT    sodium chloride (NS) flush 5-10 mL  5-10 mL IntraVENous PRN    cefepime (MAXIPIME) 2 g in 0.9% sodium chloride (MBP/ADV) 100 mL  2 g IntraVENous Q12H    levoFLOXacin (LEVAQUIN) 750 mg in D5W IVPB  750 mg IntraVENous Q48H       Labs:  ABG Recent Labs      06/06/18   0509  06/05/18   2311   PHI  7.417  7.279*   PCO2I  64.7*  >90.0*   PO2I  88  189*   HCO3I  41.7*   --    SO2I  96   --    FIO2I  0.70   --         CBC Recent Labs      06/06/18   0541  06/05/18   2112   WBC  14.8*  15.8*   HGB  10.1*  11.8   HCT  34.2*  40.3   PLT  219  243   MCV  100.6*  102.0*   MCH  29.7  98.7        Metabolic  Panel Recent Labs      06/06/18   0541  06/05/18   2112   NA  141  140   K  4.0  4.0   CL  98  92*   CO2  37*  40*   GLU  118*  157*   BUN  15  20   CREA  0.54*  0.75   CA  9.0  9.6   ALB   --   3.7   SGOT   --   21   ALT   --   24        Pertinent Labs                Ana Deshaundenise ADRIANA Snow  6/7/2018

## 2018-06-07 NOTE — PROGRESS NOTES
Hospitalist Progress Note  Jessika Tatum MD  Answering service: [phone] OR [phone] from in house phone        Date of Service:  2018  NAME:  Eufemia Wagner  :  1935  MRN:  [id number]      Admission Summary:    An 42-year-old white female with past medical history of chronic hypoxic respiratory failure on home oxygen, COPD, hyperlipidemia, rheumatoid arthritis, chronic pain syndrome, hyperlipidemia, presented to the emergency department from home with noted altered mental status and fatigue. The patient is a non-historian and unresponsive. She is accompanied by her daughter who notes she is the medical power of  and her son. The patient's daughter provides most of the history from the family perspective and remainder of history was obtained from review of ED and medical reports. Per the collective reports, the patient lives at home with her daughter. According to her daughter, the patient was able to talk and was at her baseline mental status this morning. She notes that at approximately 3:00 p.m., the patient was found unresponsive    Interval history / Subjective:   Minimally responsive , on BiPAP d/w RN     Assessment & Plan:     1. Acute on chronic hypoxic hypercapnic respiratory failure. - due to PNA with COPD   - cont BiPAP and wean to NC O2   - cont abx follow up cultures broad coverage for now  - Cont nebs as scheduled    2. SIRS with leukocytosis (system inflammatory response syndrome). - Continue the patient on broad spectrum IV antibiotics of cefepime, levofloxacin, add vancomycin. Monitor closely. 3. Metabolic encephalopathy due to above no 1 and narcotic use at home    4. Right scapular back wound infection. Continue IV antibiotics as noted above. Consult wound care team, may need debridement. Moderate staph in wound on vanco    5. Generalized weakness/debility. Plan as noted above.   Fall precautions. PT/OT when able    6. Tachycardia. resolved ECHO EF 45% NWMA    7. Hypertension. Provide antihypertensive meds as needed.     Code status:DNR  DVT prophylaxis: Lovenox    Care Plan discussed with: Patient/Family and Nurse  Disposition: TBD poor prognosis may consider hospice care     Hospital Problems  Date Reviewed: 6/6/2018          Codes Class Noted POA    * (Principal)Acute on chronic respiratory failure with hypoxia [facility]) ICD-10-CM: J96.21  ICD-9-CM: 518.84, 799.02  6/5/2018 Unknown                Review of Systems:   Review of systems not obtained due to patient factors. Vital Signs:    Last 24hrs VS reviewed since prior progress note. Most recent are:  Visit Vitals    /73 (BP 1 Location: Right arm, BP Patient Position: At rest)    Pulse 74    Temp 98.2 °F (36.8 °C)    Resp 27    Ht 4' (1.219 m)    Wt 46.8 kg (103 lb 1.6 oz)    SpO2 100%    BMI 31.46 kg/m2         Intake/Output Summary (Last 24 hours) at 06/07/18 1048  Last data filed at 06/07/18 0620   Gross per 24 hour   Intake           393.33 ml   Output              535 ml   Net          -141.67 ml        Physical Examination:             Constitutional:  No acute distress, on BiPAP    ENT:  MM dry,    Resp:  CTA bilaterally. Poor air entry   CV:  Regular rhythm, normal rate,    GI:  Soft, non distended, non tender. bs+    Musculoskeletal:  No edema, warm, 2+ pulses throughout    Neurologic:  Moves all extremities.   AAOx0            Data Review:    I personally reviewed  Image and albs      Labs:     Recent Labs      06/06/18   0541  06/05/18 2112   WBC  14.8*  15.8*   HGB  10.1*  11.8   HCT  34.2*  40.3   PLT  219  243     Recent Labs      06/06/18   0541  06/05/18 2112   NA  141  140   K  4.0  4.0   CL  98  92*   CO2  37*  40*   BUN  15  20   CREA  0.54*  0.75   GLU  118*  157*   CA  9.0  9.6     Recent Labs      06/05/18 2112   SGOT  21   ALT  24   AP  87   TBILI  0.5   TP  8.1   ALB  3.7   GLOB  4.4*     No results for input(s): INR, PTP, APTT in the last 72 hours. No lab exists for component: INREXT   No results for input(s): FE, TIBC, PSAT, FERR in the last 72 hours. No results found for: FOL, RBCF   No results for input(s): PH, PCO2, PO2 in the last 72 hours. No results for input(s): CPK, CKNDX, TROIQ in the last 72 hours.     No lab exists for component: CPKMB  No results found for: CHOL, CHOLX, CHLST, CHOLV, HDL, LDL, LDLC, DLDLP, TGLX, TRIGL, TRIGP, CHHD, CHHDX  Lab Results   Component Value Date/Time    Glucose (POC) 142 (H) 06/07/2018 06:45 AM     Lab Results   Component Value Date/Time    Color YELLOW/STRAW 06/05/2018 09:16 PM    Appearance CLEAR 06/05/2018 09:16 PM    Specific gravity 1.022 06/05/2018 09:16 PM    pH (UA) 6.0 06/05/2018 09:16 PM    Protein 30 (A) 06/05/2018 09:16 PM    Glucose NEGATIVE  06/05/2018 09:16 PM    Ketone NEGATIVE  06/05/2018 09:16 PM    Urobilinogen 0.2 06/05/2018 09:16 PM    Nitrites NEGATIVE  06/05/2018 09:16 PM    Leukocyte Esterase NEGATIVE  06/05/2018 09:16 PM    Epithelial cells FEW 06/05/2018 09:16 PM    Bacteria NEGATIVE  06/05/2018 09:16 PM    WBC 0-4 06/05/2018 09:16 PM    RBC 10-20 06/05/2018 09:16 PM         Medications Reviewed:     Current Facility-Administered Medications   Medication Dose Route Frequency    vancomycin (VANCOCIN) 1250 mg in  ml infusion  1,250 mg IntraVENous ONCE    sodium chloride (NS) flush 5-10 mL  5-10 mL IntraVENous Q8H    sodium chloride (NS) flush 5-10 mL  5-10 mL IntraVENous PRN    methylPREDNISolone (PF) (SOLU-MEDROL) injection 40 mg  40 mg IntraVENous Q8H    dextrose 5 % - 0.45% NaCl infusion  50 mL/hr IntraVENous CONTINUOUS    albuterol-ipratropium (DUO-NEB) 2.5 MG-0.5 MG/3 ML  3 mL Nebulization QID RT    budesonide (PULMICORT) 500 mcg/2 ml nebulizer suspension  500 mcg Nebulization BID RT    arformoterol (BROVANA) neb solution 15 mcg  15 mcg Nebulization BID RT    sodium chloride (NS) flush 5-10 mL  5-10 mL IntraVENous PRN    cefepime (MAXIPIME) 2 g in 0.9% sodium chloride (MBP/ADV) 100 mL  2 g IntraVENous Q12H    levoFLOXacin (LEVAQUIN) 750 mg in D5W IVPB  750 mg IntraVENous Q48H     ______________________________________________________________________  EXPECTED LENGTH OF STAY: 3d 16h  ACTUAL LENGTH OF STAY:          2                 Kale Faye MD

## 2018-06-08 NOTE — PROGRESS NOTES
Palliative Medicine Consult  [place]: [phone] ([phone])    Patient Name: Leonides Barnard  YOB: 1935    Date of Initial Consult: June 7, 2018  Reason for Consult:  Care Decisions; end stage COPD on bipap for PNA not doing well, consider hospice care  Requesting Provider: Dr. Estela Stringer  Primary Care Physician: Rishi Eastman MD     SUMMARY:   Leonides Barnard is a 80 y.o. with a past history of chronic hypoxic respiratory failure on home oxygen, COPD, HLD, HTN, RA, chronic pain syndrome, who was admitted on 6/5/2018 from home due to [address] with a diagnosis of acute on chronic hypoxic hypercapnic respiratory failure, SIRS with leukocytosis, metabolic encephalopathy, rt scapular back wound infection, generalized weakness, debility, tachycardia. Pt minimally responsive on bipap. Comfort measures recommended if pt fails to improve in the upcoming days. Current medical issues leading to Palliative Medicine involvement include: care decisions given high risk of mortality. 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 Shortness of breath  2. Feeding difficulties   3. Dying care  4. Physical Debility      PLAN:   1. All of the children are at the bedside  2. Pt more alert but has not clinically improved overnight  3. Reviewed the option for full comfort measures  4. Pt requested that bipap be removed and not to have any more needles  5. Family elected to transition the pt to full comfort measures  6. Comfort orders placed  7. Pastoral care contacted  8. Emotional presence provided to the family   9. Will place hospice consult later today as family is very emotional at this time. 10. Initial consult note routed to primary continuity provider  11.  Communicated plan of care with: Palliative IDT       GOALS OF CARE / TREATMENT PREFERENCES:     GOALS OF CARE:  Patient/Health Care Proxy Stated Goals: Other (comment) (continue current level of care)      TREATMENT PREFERENCES:   Code Status: DNR    Advance Care Planning:  Advance Care Planning 5/10/2018   Patient's Healthcare Decision Maker is: Named in scanned ACP document   Primary Decision Maker Name Luisa Howard   Primary Decision Maker Phone Number [phone]   Primary Decision Maker Relationship to Patient Adult child   Confirm Advance Directive None   Patient Would Like to Complete Advance Directive No   Does the patient have other document types Power of        Medical Interventions: Limited additional interventions   Other Instructions: Other:    As far as possible, the palliative care team has discussed with patient / health care proxy about goals of care / treatment preferences for patient. HISTORY:     History obtained from: family, chart, team    CHIEF COMPLAINT: obtunded    HPI/SUBJECTIVE:    The patient is:   [] Verbal and participatory  [x] Non-participatory due to:   obtunded    Clinical Pain Assessment (nonverbal scale for severity on nonverbal patients): Activity (Movement): Lying quietly, normal position    Duration: for how long has pt been experiencing pain (e.g., 2 days, 1 month, years)  Frequency: how often pain is an issue (e.g., several times per day, once every few days, constant)     FUNCTIONAL ASSESSMENT:     Palliative Performance Scale (PPS):  PPS: 30       PSYCHOSOCIAL/SPIRITUAL SCREENING:     Palliative IDT has assessed this patient for cultural preferences / practices and a referral made as appropriate to needs (Cultural Services, Patient Advocacy, Ethics, etc.)    Advance Care Planning:  Advance Care Planning 5/10/2018   Patient's Healthcare Decision Maker is: Named in scanned ACP document   Primary Decision Maker Name Luisa Howard   Primary Decision Maker Phone Number [phone]   Primary Decision Maker Relationship to Patient Adult child   Confirm Advance Directive None   Patient Would Like to Complete Advance Directive No   Does the patient have other document types Power of        Any spiritual / Sikhism concerns:  [] Yes /  [x] No    Caregiver Burnout:  [] Yes /  [x] No /  [] No Caregiver Present      Anticipatory grief assessment:   [x] Normal  / [] Maladaptive       ESAS Anxiety:      ESAS Depression:          REVIEW OF SYSTEMS:     Positive and pertinent negative findings in ROS are noted above in HPI. The following systems were [] reviewed / [x] unable to be reviewed as noted in HPI  Other findings are noted below. Systems: constitutional, ears/nose/mouth/throat, respiratory, gastrointestinal, genitourinary, musculoskeletal, integumentary, neurologic, psychiatric, endocrine. Positive findings noted below. Modified ESAS Completed by: provider                                   Stool Occurrence(s): 1        PHYSICAL EXAM:     From RN flowsheet:  Wt Readings from Last 3 Encounters:   06/08/18 100 lb 15.5 oz (45.8 kg)     Blood pressure 135/71, pulse 91, temperature 99.3 °F (37.4 °C), resp. rate 30, height 4' (1.219 m), weight 100 lb 15.5 oz (45.8 kg), SpO2 100 %.     Pain Scale 1: Adult Nonverbal Pain Scale  Pain Intensity 1: 0              Pain Intervention(s) 1: Repositioned  Last bowel movement, if known:     Constitutional: unresponsive  Eyes: closed  ENMT: no nasal discharge, moist mucous membranes  Cardiovascular: regular rhythm  Respiratory: breathing not labored on NRB, symmetric  Gastrointestinal: soft non-tender, +bowel sounds  Musculoskeletal: scoliosis   Skin: rt scapular wound  Neurologic: obtunded   Psychiatric:   Other:       HISTORY:     Principal Problem:    Acute on chronic respiratory failure with hypoxia ([facility] (6/5/2018)      Past Medical History:   Diagnosis Date    Autoimmune disease ([facility]     RA    Chronic obstructive pulmonary disease ([facility] [facility]     Hypertension     Ill-defined condition     high cholesterol    Ill-defined condition     1 kidney      Past Surgical History:   Procedure Laterality Date    HX OTHER SURGICAL      kidney removed      Family History   Problem Relation Age of Onset    Heart Disease Mother     Heart Disease Sister       History reviewed, no pertinent family history. Social History   Substance Use Topics    Smoking status: Former Smoker    Smokeless tobacco: Never Used      Comment: quit in 1980's    Alcohol use No     No Known Allergies   Current Facility-Administered Medications   Medication Dose Route Frequency    furosemide (LASIX) 10 mg/mL injection        morphine 10 mg/5 mL oral solution 10 mg  10 mg Oral Q1H PRN    LORazepam (INTENSOL) 2 mg/mL oral concentrate 1 mg  1 mg SubLINGual Q1H PRN    hyoscyamine SL (LEVSIN/SL) tablet 0.125 mg  0.125 mg SubLINGual Q4H PRN    sodium chloride (NS) flush 5-10 mL  5-10 mL IntraVENous PRN    methylPREDNISolone (PF) (SOLU-MEDROL) injection 40 mg  40 mg IntraVENous Q8H    albuterol-ipratropium (DUO-NEB) 2.5 MG-0.5 MG/3 ML  3 mL Nebulization QID RT    budesonide (PULMICORT) 500 mcg/2 ml nebulizer suspension  500 mcg Nebulization BID RT    arformoterol (BROVANA) neb solution 15 mcg  15 mcg Nebulization BID RT          LAB AND IMAGING FINDINGS:     Lab Results   Component Value Date/Time    WBC 14.8 (H) 06/06/2018 05:41 AM    HGB 10.1 (L) 06/06/2018 05:41 AM    PLATELET 859 77/60/8048 05:41 AM     Lab Results   Component Value Date/Time    Sodium 147 (H) 06/08/2018 04:49 AM    Potassium 3.7 06/08/2018 04:49 AM    Chloride 106 06/08/2018 04:49 AM    CO2 35 (H) 06/08/2018 04:49 AM    BUN 20 06/08/2018 04:49 AM    Creatinine 0.57 06/08/2018 04:49 AM    Calcium 9.1 06/08/2018 04:49 AM      Lab Results   Component Value Date/Time    AST (SGOT) 21 06/05/2018 09:12 PM    Alk.  phosphatase 87 06/05/2018 09:12 PM    Protein, total 8.1 06/05/2018 09:12 PM    Albumin 3.7 06/05/2018 09:12 PM    Globulin 4.4 (H) 06/05/2018 09:12 PM     No results found for: INR, PTMR, PTP, PT1, PT2, APTT   No results found for: IRON, FE, TIBC, IBCT, PSAT, FERR   No results found for: PH, PCO2, PO2  No components found for: GLPOC   No results found for: CPK, CKMB             Total time: 65 minutes  Counseling / coordination time, spent as noted above: 55 minutes  > 50% counseling / coordination?: y    Prolonged service was provided for  [x]30 min   []75 min in face to face time in the presence of the patient, spent as noted above. Time Start:  0920  Time End: 9115  Note: this can only be billed with  (initial) or 21 [phone] (follow up). If multiple start / stop times, list each separately.

## 2018-06-08 NOTE — PROGRESS NOTES
Requested by nurse to provide support to family of Ms Adams in [address] [place] [address]. Patient appeared to be resting quietly. One of patient's sons and her granddaughter were at the bedside holding patient's hand; two other family members appeared to be asleep. Provided compassionate presence as family members at her bedside shared that they had no needs at that time; they expressed appreciation for ongoing  support. : Rev. Josue Elliott.  Conchis Solo; [facility], to contact [phone] call: [phone]

## 2018-06-08 NOTE — PROGRESS NOTES
RT at bedside to evaluate pt's mask fit. Mask is fitting proper at this time. Leak is 9. Pt is resting comfortable at this time. Spoke with family at bedside about full face mask option. Family feels pt is more comfortable now and pt would not like full face mask. Rt will continue to monitor.

## 2018-06-08 NOTE — ROUTINE PROCESS
Bedside shift change report given to suad uribe rn (oncoming nurse) by Annel Villalta (offgoing nurse). Report included the following information SBAR and Kardex.

## 2018-06-08 NOTE — PROGRESS NOTES
Requested by nurse to provide support to family members of Ms Adams in [address] [place] [address]. Patient's two sons, daughter, and several grandchildren were at her bedside when  arrived; patient was resting with eyes closed. Provided active listening as patient's daughter shared that they had decided to transition patient to comfort care; daughter and son stated they did not want to see their mother suffer. Family members were tearful and actively engaged in anticipatory grieving. With family's permission, had prayer on behalf of patient and family. Assured them of continued prayers and ongoing  availability for support. : Rev. Aime Nelson.  Cherelle Aparicio; [facility], to contact [phone] call: [phone]

## 2018-06-08 NOTE — PROGRESS NOTES
06/08/18 1548   Vital Signs   Temp 98 °F (36.7 °C)   Temp Source Axillary   Pulse (Heart Rate) (!) 101   Heart Rate Source Monitor   Resp Rate (!) 32   O2 Sat (%) (!) 60 %   Level of Consciousness Alert   /58   MAP (Calculated) 73   BP 1 Method Automatic   BP 1 Location Right arm   BP Patient Position At rest   MEWS Score 4   Patient is comfort care, transferred from ICU. Family at bedside. MD aware.

## 2018-06-08 NOTE — PROGRESS NOTES
1930:Bedside and Verbal shift change report given to Wilma Jasso RN (oncoming nurse) by Karli Singh RN (offgoing nurse). Report included the following information SBAR, Kardex, Intake/Output, MAR, Accordion, Recent Results, Med Rec Status and Cardiac Rhythm SR/ST.

## 2018-06-08 NOTE — PROGRESS NOTES
TRANSFER - OUT REPORT:    Verbal report given to natalie roman(name) on Tonja Adams  being transferred to (unit) for routine progression of care       Report consisted of patients Situation, Background, Assessment and   Recommendations(SBAR). Information from the following report(s) SBAR, Kardex and MAR was reviewed with the receiving nurse. Lines:       Opportunity for questions and clarification was provided.       Patient transported with:   O2 @ 2 liters  Registered Nurse  [facility]

## 2018-06-08 NOTE — PROGRESS NOTES
TRANSFER - IN REPORT:    Verbal report received from [facility] RN (name) on [facility]  being received from ICU (unit) for routine progression of care      Report consisted of patients Situation, Background, Assessment and   Recommendations(SBAR). Information from the following report(s) SBAR, Kardex, [facility] and Recent Results was reviewed with the receiving nurse. Opportunity for questions and clarification was provided. Assessment completed upon patients arrival to unit and care assumed.

## 2018-06-08 NOTE — PROGRESS NOTES
Problem: Pressure Injury - Risk of  Goal: *Prevention of pressure injury  Document Josue Scale and appropriate interventions in the flowsheet. Outcome: Progressing Towards Goal  Pressure Injury Interventions:  Sensory Interventions: Assess need for specialty bed, Check visual cues for pain, Float heels, Keep linens dry and wrinkle-free    Moisture Interventions: Absorbent underpads, Check for incontinence Q2 hours and as needed, Contain wound drainage    Activity Interventions: Assess need for specialty bed, Pressure redistribution bed/mattress(bed type)    Mobility Interventions: Float heels, HOB 30 degrees or less, Pressure redistribution bed/mattress (bed type)    Nutrition Interventions: Document food/fluid/supplement intake    Friction and Shear Interventions: Apply protective barrier, creams and emollients, Lift team/patient mobility team               Problem: Impaired Skin Integrity/Pressure Injury Treatment  Goal: *Prevention of pressure injury  Document Josue Scale and appropriate interventions in the flowsheet.    Outcome: Not Progressing Towards Goal  Pressure Injury Interventions:  Sensory Interventions: Assess need for specialty bed, Check visual cues for pain, Float heels, Keep linens dry and wrinkle-free    Moisture Interventions: Absorbent underpads, Check for incontinence Q2 hours and as needed, Contain wound drainage    Activity Interventions: Assess need for specialty bed, Pressure redistribution bed/mattress(bed type)    Mobility Interventions: Float heels, HOB 30 degrees or less, Pressure redistribution bed/mattress (bed type)    Nutrition Interventions: Document food/fluid/supplement intake    Friction and Shear Interventions: Apply protective barrier, creams and emollients, Lift team/patient mobility team

## 2018-06-09 LAB
FLUID CULTURE, SPNG2: NORMAL
L PNEUMO1 AG UR QL IA: NEGATIVE
ORGANISM ID, SPNG3: NORMAL
PLEASE NOTE, SPNG4: NORMAL
S PNEUM AG SPEC QL LA: NEGATIVE
SPECIMEN SOURCE: NORMAL
SPECIMEN SOURCE: NORMAL
SPECIMEN, SPNG1: NORMAL

## 2018-06-09 PROCEDURE — 77010033678 HC OXYGEN DAILY

## 2018-06-09 NOTE — PROGRESS NOTES
I was called to examine patient who  at 21:30 hours. On final examination, patient had:  No response to verbal and tactile stimuli. No respiratory effort. Absent heart sounds and pulses. Pupils fixed and dilated. Patient was pronounced dead at 23:34 hours.      Erinn Espana MD   Hospitalist

## 2018-06-09 NOTE — PROGRESS NOTES
Responded to page for pastoral support in [address] [facility] [address]. Multiple family and friends were gathered bedside holding espitia. Pt was laying resting in the bed with agonal breathing yet without any distress. Family requested prayer for this pt.  offered prayer for this pt and her family. Pt  shortly after prayer. Pt's granddtr is intensely grieving while other family is experiencing somber grief. [address] [place] [address] remained available for support even as members of the family comforted one another. Affirmed ongoing availability of support. Hakeem Owen MDiv.  Staff   Please call [phone] to page  if needed

## 2018-06-09 NOTE — DISCHARGE SUMMARY
Discharge Summary       PATIENT ID: Svetlana Ochoa  MRN: [id number]   YOB: 1935    DATE OF ADMISSION: 6/5/2018  8:55 PM    DATE OF DISCHARGE/DEATH: 6/8/18 23:34 PM  PRIMARY CARE PROVIDER: Miguel Angel Bustamante MD     ATTENDING PHYSICIAN: Deric Lee MD  DISCHARGING PROVIDER: Deric Lee MD    To contact this individual call [phone] and ask the  to page. If unavailable ask to be transferred the Adult Hospitalist Department. CONSULTATIONS: None    PROCEDURES/SURGERIES: * No surgery found *    ADMITTING DIAGNOSES & HOSPITAL COURSE:     As per Dr Ivone Koenig H&P:     CHIEF COMPLAINT:  Patient does not provide.     HISTORY OF PRESENT ILLNESS:  An 24-year-old white female with past medical history of chronic hypoxic respiratory failure on home oxygen, COPD, hyperlipidemia, rheumatoid arthritis, chronic pain syndrome, hyperlipidemia, presented to the emergency department from home with noted altered mental status and fatigue. The patient is a non-historian and unresponsive. She is accompanied by her daughter who notes she is the medical power of  and her son. The patient's daughter provides most of the history from the family perspective and remainder of history was obtained from review of ED and medical reports. Per the collective reports, the patient lives at home with her daughter. According to her daughter, the patient was able to talk and was at her baseline mental status this morning. She notes that at approximately 3:00 p.m., the patient was found unresponsive. The patient reportedly had increased shortness of breath. At baseline the patient has chronic hypoxic respiratory failure, is on 4 liters O2 via nasal cannula with known history of COPD. She reportedly had appeared diaphoretic and felt warm, according to the reports. ED had noted the patient \"has a cold\" and had been around \"sick people\" recently.   On arrival to the emergency department patient arrived via private transport. Initial recorded vital signs, blood pressure 146/76, heart rate 121, respiration rate of 39, O2 saturation 77%. The patient was placed initially on 5 liters of oxygen via nasal cannula. Workup included chest x-ray, portable, showing atelectasis in the left lower lobe, otherwise no acute process. Labs showed elevated WBC of 15,800. Venous blood gas showed a pCO2 of 86.0 with a PvO2 of 43% on 100% nonrebreather. ED reportedly called a code sepsis and then started the patient on cefepime 2 grams IV, levofloxacin 750 mg IV, 0.9% normal saline 1000 mL fluid bolus. Hospitalist was then called to admit the patient to the medical service. Upon arrival at the bedside, the patient appeared unresponsive. Upon evaluation of the patient in discussion with the patient's daughter, she had noted that the patient had taken hydrocodone approximately 2 tablets today which she reportedly takes for pain. Upon notification of the same, I had ordered Narcan prior to evaluation or ABG. There have been no reports of the patient having recent falls, head or neck trauma. Additional review of systems not obtained, as the patient is not responsive.     Pt was initially admitted to the ICU and then subsequently transferred to the 2nd floor after being made comfort measures only upon discussion with family. Overall, pt was managed for        DISCHARGE DIAGNOSES / PLAN:      1.  Acute on chronic hypoxic hypercapnic respiratory failure.    -Likely due to PNA with COPD   -Pt was initially admitted to the ICU and placed on BIPAP. She was  started on antibiotics and nebulizers. -Pt was not improving so palliative care was consulted and pt was placed on comfort measures only and transferred to a regular floor.   -She was noted to stop breathing by nurse at 21:30 hours and subsequently pronounced dead by Dr Deandra Rothman at 23:34 hours on 6/8/18.  Family was present during her death.              CHRONIC MEDICAL DIAGNOSES:  Problem List as of 6/8/2018  Date Reviewed: 6/6/2018          Codes Class Noted - Resolved    * (Principal)Acute on chronic respiratory failure with hypoxia [facility]) ICD-10-CM: J96.21  ICD-9-CM: 518.84, 799.02  6/5/2018 - Present        Unstageable pressure ulcer of right lower back [facility]) ICD-10-CM: L89.130  ICD-9-CM: 707.03, 707.25  5/10/2018 - Present              Less than 30 minutes was spent with the patient on counseling and coordination of care    Signed:   Rosibel Lee MD  6/9/2018  7:28 AM

## 2018-06-10 LAB
BACTERIA SPEC CULT: NORMAL
SERVICE CMNT-IMP: NORMAL

## 2023-12-28 NOTE — PROGRESS NOTES
Hospitalist Progress Note  Fidelia Lee MD  Answering service: [phone] from in house phone        Date of Service:  2018  NAME:  Cherelle Steve  :  1935  MRN:  [id number]      Admission Summary:   40-year-old white female with PMH of chronic hypoxic respiratory failure, oxygen dependent COPD, hyperlipidemia, rheumatoid arthritis, chronic pain syndrome, hyperlipidemia admitted on 18 after being brought to the ER by family for evaluation of altered mental status and fatigue. She was accompanied by her daughter who is the medical power of  and her son. Pt is DNR. She was placed on BIPAP and admitted to the ICU for acute on chronic hypoxic respiratory failure likely due to PNA and acute COPD exacerbation. She was started on abx and steroids but did not seem to be improving. Family decided to make her CMO today after d/w palliative care service. Interval history / Subjective:   Pt seen and examined this pm. Family was present at bedside. Pt is unresponsive but she seems comfortable. Assessment & Plan:     1. Acute on chronic hypoxic hypercapnic respiratory failure.    -Likely due to PNA with COPD   -Pt is now CMO     2. SIRS with leukocytosis:     -All abx has been stopped. Pt now CMO. 3. Metabolic encephalopathy:   -Likely due to #1     4. Right scapular back wound infection.   -Continue wound care     Code status:DNR/DNI  DVT prophylaxis: None    Care Plan discussed with: Patient/Family  Disposition: Pt will likely  within the next 24-48 hours. Hospital Problems  Date Reviewed: 2018          Codes Class Noted POA    * (Principal)Acute on chronic respiratory failure with hypoxia [facility]) ICD-10-CM: J96.21  ICD-9-CM: 518.84, 799.02  2018 Unknown                Review of Systems:   Review of systems not obtained due to patient factors.        Vital Signs:    Last 24hrs VS reviewed since prior progress note. Most recent are:  Visit Vitals    /58 (BP 1 Location: Right arm, BP Patient Position: At rest)    Pulse (!) 101    Temp 98 °F (36.7 °C)    Resp (!) 32    Ht 4' (1.219 m)    Wt 45.8 kg (100 lb 15.5 oz)    SpO2 (!) 60%    BMI 30.81 kg/m2         Intake/Output Summary (Last 24 hours) at 06/08/18 1904  Last data filed at 06/08/18 1300   Gross per 24 hour   Intake             1250 ml   Output             1620 ml   Net             -370 ml        Physical Examination:             Constitutional:  No acute distress. HEENT:  MM dry. Mouth breathing. Resp:  CTA bilaterally. Poor air entry   CV:  Regular rhythm, normal rate,    GI:  Soft, non distended, non tender. bs+    Musculoskeletal:  No edema, warm, 2+ pulses throughout    Neurologic:  Pt is obtunded.              Data Review:    I personally reviewed  Image and albs      Labs:     Recent Labs      06/06/18   0541  06/05/18   2112   WBC  14.8*  15.8*   HGB  10.1*  11.8   HCT  34.2*  40.3   PLT  219  243     Recent Labs      06/08/18   0449  06/06/18   0541  06/05/18   2112   NA  147*  141  140   K  3.7  4.0  4.0   CL  106  98  92*   CO2  35*  37*  40*   BUN  20  15  20   CREA  0.57  0.54*  0.75   GLU  159*  118*  157*   CA  9.1  9.0  9.6     Recent Labs      06/05/18   2112   SGOT  21   ALT  24   AP  87   TBILI  0.5   TP  8.1   ALB  3.7   GLOB  4.4*         Lab Results   Component Value Date/Time    Glucose (POC) 155 (H) 06/07/2018 04:46 PM    Glucose (POC) 152 (H) 06/07/2018 11:55 AM    Glucose (POC) 142 (H) 06/07/2018 06:45 AM     Lab Results   Component Value Date/Time    Color YELLOW/STRAW 06/05/2018 09:16 PM    Appearance CLEAR 06/05/2018 09:16 PM    Specific gravity 1.022 06/05/2018 09:16 PM    pH (UA) 6.0 06/05/2018 09:16 PM    Protein 30 (A) 06/05/2018 09:16 PM    Glucose NEGATIVE  06/05/2018 09:16 PM    Ketone NEGATIVE  06/05/2018 09:16 PM    Urobilinogen 0.2 06/05/2018 09:16 PM    Nitrites NEGATIVE  06/05/2018 09:16 PM    Leukocyte Esterase NEGATIVE  06/05/2018 09:16 PM    Epithelial cells FEW 06/05/2018 09:16 PM    Bacteria NEGATIVE  06/05/2018 09:16 PM    WBC 0-4 06/05/2018 09:16 PM    RBC 10-20 06/05/2018 09:16 PM         Medications Reviewed:     Current Facility-Administered Medications   Medication Dose Route Frequency    morphine (ROXANOL) 100 mg/5 mL (20 mg/mL) concentrated solution 10 mg  10 mg Oral Q1H PRN    LORazepam (INTENSOL) 2 mg/mL oral concentrate 1 mg  1 mg SubLINGual Q1H PRN    hyoscyamine SL (LEVSIN/SL) tablet 0.125 mg  0.125 mg SubLINGual Q4H PRN     ______________________________________________________________________  EXPECTED LENGTH OF STAY: 3d 16h  ACTUAL LENGTH OF STAY:          3                 Fidelia Lee MD 1 pair